# Patient Record
Sex: FEMALE | URBAN - METROPOLITAN AREA
[De-identification: names, ages, dates, MRNs, and addresses within clinical notes are randomized per-mention and may not be internally consistent; named-entity substitution may affect disease eponyms.]

---

## 2017-09-24 ENCOUNTER — EMERGENCY (EMERGENCY)
Facility: HOSPITAL | Age: 27
LOS: 1 days | Discharge: HOME | End: 2017-09-24
Attending: EMERGENCY MEDICINE | Admitting: EMERGENCY MEDICINE
Payer: COMMERCIAL

## 2017-09-24 VITALS
RESPIRATION RATE: 18 BRPM | SYSTOLIC BLOOD PRESSURE: 128 MMHG | HEART RATE: 68 BPM | DIASTOLIC BLOOD PRESSURE: 77 MMHG | TEMPERATURE: 98 F | OXYGEN SATURATION: 100 %

## 2017-09-24 PROCEDURE — 99285 EMERGENCY DEPT VISIT HI MDM: CPT | Mod: 25

## 2017-09-24 RX ORDER — KETOROLAC TROMETHAMINE 30 MG/ML
30 SYRINGE (ML) INJECTION ONCE
Refills: 0 | Status: DISCONTINUED | OUTPATIENT
Start: 2017-09-24 | End: 2017-09-24

## 2017-09-24 RX ORDER — SODIUM CHLORIDE 9 MG/ML
1000 INJECTION INTRAMUSCULAR; INTRAVENOUS; SUBCUTANEOUS ONCE
Refills: 0 | Status: COMPLETED | OUTPATIENT
Start: 2017-09-24 | End: 2017-09-24

## 2017-09-24 RX ORDER — SODIUM CHLORIDE 9 MG/ML
3 INJECTION INTRAMUSCULAR; INTRAVENOUS; SUBCUTANEOUS ONCE
Refills: 0 | Status: COMPLETED | OUTPATIENT
Start: 2017-09-24 | End: 2017-09-24

## 2017-09-24 RX ORDER — ONDANSETRON 8 MG/1
4 TABLET, FILM COATED ORAL ONCE
Refills: 0 | Status: COMPLETED | OUTPATIENT
Start: 2017-09-24 | End: 2017-09-24

## 2017-09-24 RX ADMIN — ONDANSETRON 4 MILLIGRAM(S): 8 TABLET, FILM COATED ORAL at 23:30

## 2017-09-24 RX ADMIN — SODIUM CHLORIDE 3 MILLILITER(S): 9 INJECTION INTRAMUSCULAR; INTRAVENOUS; SUBCUTANEOUS at 23:30

## 2017-09-24 RX ADMIN — SODIUM CHLORIDE 1000 MILLILITER(S): 9 INJECTION INTRAMUSCULAR; INTRAVENOUS; SUBCUTANEOUS at 23:30

## 2017-09-24 RX ADMIN — Medication 30 MILLIGRAM(S): at 23:45

## 2017-09-24 RX ADMIN — Medication 30 MILLIGRAM(S): at 23:30

## 2017-09-24 NOTE — ED PROVIDER NOTE - MEDICAL DECISION MAKING DETAILS
pt with history of stones presenting w L sided pain.  Concern for stone.  Poor response to toradol and UA showing infection.  Will require CT to ensure not an infected stone.  If negative will treat for pyelo if positive will give IV Abx and consult urology

## 2017-09-24 NOTE — ED PROVIDER NOTE - CARE PLAN
Principal Discharge DX:	Ureteral colic Principal Discharge DX:	Ureteral colic  Instructions for follow-up, activity and diet:	Take medication as prescribed. Rest, drink plenty of fluids.  Advance activity as tolerated.  Continue all previously prescribed medications as directed. You can use motrin 600mg every 6-8 hours for pain or fever, and/or Tylenol 650 mg every 4 hours for pain/fever. Follow up with your primary care physician in 48-72 hours- bring copies of your results.  Return to the emergency department for chest pain, shortness of breath, dizziness, or worsening, concerning, or persistent symptoms. Principal Discharge DX:	Pyelonephritis  Instructions for follow-up, activity and diet:	Take medication as prescribed. Rest, drink plenty of fluids.  Advance activity as tolerated.  Continue all previously prescribed medications as directed. You can use motrin 600mg every 6-8 hours for pain or fever, and/or Tylenol 650 mg every 4 hours for pain/fever. Follow up with your primary care physician in 48-72 hours- bring copies of your results.  Return to the emergency department for chest pain, shortness of breath, dizziness, or worsening, concerning, or persistent symptoms.

## 2017-09-24 NOTE — ED PROVIDER NOTE - OBJECTIVE STATEMENT
27 yo F with history of 6 R sided kidney stones never needing intervention presenting with L flank pain for the last day.  Similar to previous stones.  +Nausea and no vomiting no fevers and no dysuria.  Pain is severe sharp and radiates to the lower back and groin.

## 2017-09-24 NOTE — ED ADULT TRIAGE NOTE - CHIEF COMPLAINT QUOTE
c/o left back/flank radiating down to left leg x 1 day. pt endorses nausea. denies fever, chills, or urinary symptoms. hx of kidney stones.

## 2017-09-24 NOTE — ED PROVIDER NOTE - PLAN OF CARE
Take medication as prescribed. Rest, drink plenty of fluids.  Advance activity as tolerated.  Continue all previously prescribed medications as directed. You can use motrin 600mg every 6-8 hours for pain or fever, and/or Tylenol 650 mg every 4 hours for pain/fever. Follow up with your primary care physician in 48-72 hours- bring copies of your results.  Return to the emergency department for chest pain, shortness of breath, dizziness, or worsening, concerning, or persistent symptoms.

## 2017-09-25 VITALS
SYSTOLIC BLOOD PRESSURE: 97 MMHG | HEART RATE: 83 BPM | OXYGEN SATURATION: 100 % | RESPIRATION RATE: 18 BRPM | DIASTOLIC BLOOD PRESSURE: 56 MMHG

## 2017-09-25 LAB
APPEARANCE UR: SIGNIFICANT CHANGE UP
BACTERIA # UR AUTO: HIGH
BASOPHILS # BLD AUTO: 0.03 K/UL — SIGNIFICANT CHANGE UP (ref 0–0.2)
BASOPHILS NFR BLD AUTO: 0.4 % — SIGNIFICANT CHANGE UP (ref 0–2)
BILIRUB UR-MCNC: NEGATIVE — SIGNIFICANT CHANGE UP
BLOOD UR QL VISUAL: NEGATIVE — SIGNIFICANT CHANGE UP
BUN SERPL-MCNC: 13 MG/DL — SIGNIFICANT CHANGE UP (ref 7–23)
CALCIUM SERPL-MCNC: 9 MG/DL — SIGNIFICANT CHANGE UP (ref 8.4–10.5)
CHLORIDE SERPL-SCNC: 105 MMOL/L — SIGNIFICANT CHANGE UP (ref 98–107)
CO2 SERPL-SCNC: 24 MMOL/L — SIGNIFICANT CHANGE UP (ref 22–31)
COLOR SPEC: YELLOW — SIGNIFICANT CHANGE UP
CREAT SERPL-MCNC: 0.66 MG/DL — SIGNIFICANT CHANGE UP (ref 0.5–1.3)
EOSINOPHIL # BLD AUTO: 0.06 K/UL — SIGNIFICANT CHANGE UP (ref 0–0.5)
EOSINOPHIL NFR BLD AUTO: 0.8 % — SIGNIFICANT CHANGE UP (ref 0–6)
GLUCOSE SERPL-MCNC: 93 MG/DL — SIGNIFICANT CHANGE UP (ref 70–99)
GLUCOSE UR-MCNC: NEGATIVE — SIGNIFICANT CHANGE UP
HCT VFR BLD CALC: 36.1 % — SIGNIFICANT CHANGE UP (ref 34.5–45)
HGB BLD-MCNC: 12.9 G/DL — SIGNIFICANT CHANGE UP (ref 11.5–15.5)
HYALINE CASTS # UR AUTO: SIGNIFICANT CHANGE UP (ref 0–?)
IMM GRANULOCYTES # BLD AUTO: 0.02 # — SIGNIFICANT CHANGE UP
IMM GRANULOCYTES NFR BLD AUTO: 0.3 % — SIGNIFICANT CHANGE UP (ref 0–1.5)
KETONES UR-MCNC: NEGATIVE — SIGNIFICANT CHANGE UP
LEUKOCYTE ESTERASE UR-ACNC: HIGH
LYMPHOCYTES # BLD AUTO: 2.49 K/UL — SIGNIFICANT CHANGE UP (ref 1–3.3)
LYMPHOCYTES # BLD AUTO: 34.1 % — SIGNIFICANT CHANGE UP (ref 13–44)
MCHC RBC-ENTMCNC: 32.7 PG — SIGNIFICANT CHANGE UP (ref 27–34)
MCHC RBC-ENTMCNC: 35.7 % — SIGNIFICANT CHANGE UP (ref 32–36)
MCV RBC AUTO: 91.6 FL — SIGNIFICANT CHANGE UP (ref 80–100)
MONOCYTES # BLD AUTO: 0.76 K/UL — SIGNIFICANT CHANGE UP (ref 0–0.9)
MONOCYTES NFR BLD AUTO: 10.4 % — SIGNIFICANT CHANGE UP (ref 2–14)
MUCOUS THREADS # UR AUTO: SIGNIFICANT CHANGE UP
NEUTROPHILS # BLD AUTO: 3.95 K/UL — SIGNIFICANT CHANGE UP (ref 1.8–7.4)
NEUTROPHILS NFR BLD AUTO: 54 % — SIGNIFICANT CHANGE UP (ref 43–77)
NITRITE UR-MCNC: NEGATIVE — SIGNIFICANT CHANGE UP
NRBC # FLD: 0 — SIGNIFICANT CHANGE UP
PH UR: 7.5 — SIGNIFICANT CHANGE UP (ref 4.6–8)
PLATELET # BLD AUTO: 220 K/UL — SIGNIFICANT CHANGE UP (ref 150–400)
PMV BLD: 10.6 FL — SIGNIFICANT CHANGE UP (ref 7–13)
POTASSIUM SERPL-MCNC: 4 MMOL/L — SIGNIFICANT CHANGE UP (ref 3.5–5.3)
POTASSIUM SERPL-SCNC: 4 MMOL/L — SIGNIFICANT CHANGE UP (ref 3.5–5.3)
PROT UR-MCNC: 30 — HIGH
RBC # BLD: 3.94 M/UL — SIGNIFICANT CHANGE UP (ref 3.8–5.2)
RBC # FLD: 12.4 % — SIGNIFICANT CHANGE UP (ref 10.3–14.5)
SODIUM SERPL-SCNC: 141 MMOL/L — SIGNIFICANT CHANGE UP (ref 135–145)
SP GR SPEC: 1.02 — SIGNIFICANT CHANGE UP (ref 1–1.03)
SQUAMOUS # UR AUTO: SIGNIFICANT CHANGE UP
UROBILINOGEN FLD QL: NORMAL E.U. — SIGNIFICANT CHANGE UP (ref 0.1–0.2)
WBC # BLD: 7.31 K/UL — SIGNIFICANT CHANGE UP (ref 3.8–10.5)
WBC # FLD AUTO: 7.31 K/UL — SIGNIFICANT CHANGE UP (ref 3.8–10.5)
WBC CLUMPS #/AREA URNS HPF: PRESENT — HIGH (ref 0–?)
WBC UR QL: SIGNIFICANT CHANGE UP (ref 0–?)
YEAST BUDDING # UR COMP ASSIST: SIGNIFICANT CHANGE UP

## 2017-09-25 PROCEDURE — 74176 CT ABD & PELVIS W/O CONTRAST: CPT | Mod: 26,GC

## 2017-09-25 RX ORDER — CEFUROXIME AXETIL 250 MG
1 TABLET ORAL
Qty: 14 | Refills: 0
Start: 2017-09-25 | End: 2017-10-02

## 2017-09-25 RX ORDER — CEFTRIAXONE 500 MG/1
1 INJECTION, POWDER, FOR SOLUTION INTRAMUSCULAR; INTRAVENOUS ONCE
Refills: 0 | Status: COMPLETED | OUTPATIENT
Start: 2017-09-25 | End: 2017-09-25

## 2017-09-25 RX ORDER — MORPHINE SULFATE 50 MG/1
4 CAPSULE, EXTENDED RELEASE ORAL ONCE
Refills: 0 | Status: DISCONTINUED | OUTPATIENT
Start: 2017-09-25 | End: 2017-09-25

## 2017-09-25 RX ADMIN — MORPHINE SULFATE 4 MILLIGRAM(S): 50 CAPSULE, EXTENDED RELEASE ORAL at 00:30

## 2017-09-25 RX ADMIN — MORPHINE SULFATE 4 MILLIGRAM(S): 50 CAPSULE, EXTENDED RELEASE ORAL at 00:15

## 2017-09-25 RX ADMIN — CEFTRIAXONE 100 GRAM(S): 500 INJECTION, POWDER, FOR SOLUTION INTRAMUSCULAR; INTRAVENOUS at 03:28

## 2019-06-20 PROBLEM — Z00.00 ENCOUNTER FOR PREVENTIVE HEALTH EXAMINATION: Noted: 2019-06-20

## 2019-06-25 ENCOUNTER — OUTPATIENT (OUTPATIENT)
Dept: OUTPATIENT SERVICES | Facility: HOSPITAL | Age: 29
LOS: 1 days | End: 2019-06-25
Payer: COMMERCIAL

## 2019-06-25 ENCOUNTER — APPOINTMENT (OUTPATIENT)
Dept: ULTRASOUND IMAGING | Facility: CLINIC | Age: 29
End: 2019-06-25
Payer: COMMERCIAL

## 2019-06-25 DIAGNOSIS — Z00.8 ENCOUNTER FOR OTHER GENERAL EXAMINATION: ICD-10-CM

## 2019-06-25 PROCEDURE — 76775 US EXAM ABDO BACK WALL LIM: CPT

## 2019-06-25 PROCEDURE — 76775 US EXAM ABDO BACK WALL LIM: CPT | Mod: 26

## 2019-08-23 ENCOUNTER — ASOB RESULT (OUTPATIENT)
Age: 29
End: 2019-08-23

## 2019-08-23 ENCOUNTER — APPOINTMENT (OUTPATIENT)
Dept: OBGYN | Facility: CLINIC | Age: 29
End: 2019-08-23
Payer: COMMERCIAL

## 2019-08-23 VITALS
HEART RATE: 91 BPM | BODY MASS INDEX: 24.86 KG/M2 | DIASTOLIC BLOOD PRESSURE: 79 MMHG | SYSTOLIC BLOOD PRESSURE: 127 MMHG | HEIGHT: 65 IN | WEIGHT: 149.19 LBS

## 2019-08-23 DIAGNOSIS — Z78.9 OTHER SPECIFIED HEALTH STATUS: ICD-10-CM

## 2019-08-23 DIAGNOSIS — Z97.5 PRESENCE OF (INTRAUTERINE) CONTRACEPTIVE DEVICE: ICD-10-CM

## 2019-08-23 PROCEDURE — 99385 PREV VISIT NEW AGE 18-39: CPT

## 2019-08-23 PROCEDURE — 76830 TRANSVAGINAL US NON-OB: CPT

## 2019-08-23 NOTE — PHYSICAL EXAM
[Awake] : awake [Acute Distress] : no acute distress [Mass] : no breast mass [Alert] : alert [Nipple Discharge] : no nipple discharge [Soft] : soft [Axillary LAD] : no axillary lymphadenopathy [Tender] : non tender [Oriented x3] : oriented to person, place, and time [No Bleeding] : there was no active vaginal bleeding [Normal] : cervix [Pap Obtained] : a Pap smear was performed [IUD String] : did not have an IUD string protruding out [Uterine Adnexae] : were not tender and not enlarged

## 2019-08-23 NOTE — HISTORY OF PRESENT ILLNESS
[___ Year(s) Ago] : [unfilled] year(s) ago [Good] : being in good health [Last Pap Smear ___] : last Papanicolaou cytology done [unfilled] [No Previous Mammogram] : no previous mammogram [Last Colonoscopy ___] : last colonoscopy done [unfilled] [Reproductive Age] : is of reproductive age [unknown] : the patient is unsure of the date of her LMP

## 2019-08-27 ENCOUNTER — OUTPATIENT (OUTPATIENT)
Dept: OUTPATIENT SERVICES | Facility: HOSPITAL | Age: 29
LOS: 1 days | End: 2019-08-27

## 2019-08-27 ENCOUNTER — TRANSCRIPTION ENCOUNTER (OUTPATIENT)
Age: 29
End: 2019-08-27

## 2019-08-27 VITALS
SYSTOLIC BLOOD PRESSURE: 108 MMHG | DIASTOLIC BLOOD PRESSURE: 64 MMHG | OXYGEN SATURATION: 98 % | HEIGHT: 65 IN | RESPIRATION RATE: 16 BRPM | HEART RATE: 68 BPM | WEIGHT: 149.91 LBS | TEMPERATURE: 98 F

## 2019-08-27 DIAGNOSIS — T83.39XA OTHER MECHANICAL COMPLICATION OF INTRAUTERINE CONTRACEPTIVE DEVICE, INITIAL ENCOUNTER: ICD-10-CM

## 2019-08-27 DIAGNOSIS — K08.409 PARTIAL LOSS OF TEETH, UNSPECIFIED CAUSE, UNSPECIFIED CLASS: Chronic | ICD-10-CM

## 2019-08-27 DIAGNOSIS — Z98.890 OTHER SPECIFIED POSTPROCEDURAL STATES: Chronic | ICD-10-CM

## 2019-08-27 LAB
HCT VFR BLD CALC: 40.7 % — SIGNIFICANT CHANGE UP (ref 34.5–45)
HGB BLD-MCNC: 14 G/DL — SIGNIFICANT CHANGE UP (ref 11.5–15.5)
HPV HIGH+LOW RISK DNA PNL CVX: NOT DETECTED
MCHC RBC-ENTMCNC: 32.4 PG — SIGNIFICANT CHANGE UP (ref 27–34)
MCHC RBC-ENTMCNC: 34.4 % — SIGNIFICANT CHANGE UP (ref 32–36)
MCV RBC AUTO: 94.2 FL — SIGNIFICANT CHANGE UP (ref 80–100)
NRBC # FLD: 0 K/UL — SIGNIFICANT CHANGE UP (ref 0–0)
PLATELET # BLD AUTO: 242 K/UL — SIGNIFICANT CHANGE UP (ref 150–400)
PMV BLD: 10.3 FL — SIGNIFICANT CHANGE UP (ref 7–13)
RBC # BLD: 4.32 M/UL — SIGNIFICANT CHANGE UP (ref 3.8–5.2)
RBC # FLD: 12.7 % — SIGNIFICANT CHANGE UP (ref 10.3–14.5)
WBC # BLD: 5.53 K/UL — SIGNIFICANT CHANGE UP (ref 3.8–10.5)
WBC # FLD AUTO: 5.53 K/UL — SIGNIFICANT CHANGE UP (ref 3.8–10.5)

## 2019-08-27 RX ORDER — SODIUM CHLORIDE 9 MG/ML
1000 INJECTION, SOLUTION INTRAVENOUS
Refills: 0 | Status: DISCONTINUED | OUTPATIENT
Start: 2019-08-28 | End: 2019-09-14

## 2019-08-27 NOTE — H&P PST ADULT - HISTORY OF PRESENT ILLNESS
29y/o female presents for preop eval for scheduled d&c hysteroscopy, IUD removal on 8/28/19.  Pt states IUD shifted.

## 2019-08-27 NOTE — H&P PST ADULT - NSANTHOSAYNRD_GEN_A_CORE
No. ADI screening performed.  STOP BANG Legend: 0-2 = LOW Risk; 3-4 = INTERMEDIATE Risk; 5-8 = HIGH Risk

## 2019-08-27 NOTE — H&P PST ADULT - NSICDXPROBLEM_GEN_ALL_CORE_FT
other mechanical complication of IUD  scheduled d&c hysteroscopy, IUD removal on 8/28/19.  preop instructions, gi prophylaxis given   pt verbalized understanding  case discuss with Dr Peter giraldo on admit

## 2019-08-27 NOTE — H&P PST ADULT - NEGATIVE GENERAL GENITOURINARY SYMPTOMS
no urinary hesitancy/normal urinary frequency/no renal colic/no flank pain L/no flank pain R/no dysuria

## 2019-08-27 NOTE — H&P PST ADULT - NSICDXPASTSURGICALHX_GEN_ALL_CORE_FT
PAST SURGICAL HISTORY:  H/O arthroscopy right knee 2013, right shoulder 2007    [place] teeth extracted 2008

## 2019-08-28 ENCOUNTER — OUTPATIENT (OUTPATIENT)
Dept: OUTPATIENT SERVICES | Facility: HOSPITAL | Age: 29
LOS: 1 days | Discharge: HOME | End: 2019-08-28
Payer: COMMERCIAL

## 2019-08-28 ENCOUNTER — APPOINTMENT (OUTPATIENT)
Dept: OBGYN | Facility: HOSPITAL | Age: 29
End: 2019-08-28

## 2019-08-28 VITALS
RESPIRATION RATE: 17 BRPM | SYSTOLIC BLOOD PRESSURE: 123 MMHG | WEIGHT: 149.91 LBS | TEMPERATURE: 98 F | OXYGEN SATURATION: 100 % | HEART RATE: 78 BPM | HEIGHT: 65 IN | DIASTOLIC BLOOD PRESSURE: 63 MMHG

## 2019-08-28 VITALS
RESPIRATION RATE: 16 BRPM | HEART RATE: 67 BPM | SYSTOLIC BLOOD PRESSURE: 108 MMHG | DIASTOLIC BLOOD PRESSURE: 69 MMHG | OXYGEN SATURATION: 100 %

## 2019-08-28 DIAGNOSIS — Z98.890 OTHER SPECIFIED POSTPROCEDURAL STATES: Chronic | ICD-10-CM

## 2019-08-28 DIAGNOSIS — K08.409 PARTIAL LOSS OF TEETH, UNSPECIFIED CAUSE, UNSPECIFIED CLASS: Chronic | ICD-10-CM

## 2019-08-28 DIAGNOSIS — T83.39XA OTHER MECHANICAL COMPLICATION OF INTRAUTERINE CONTRACEPTIVE DEVICE, INITIAL ENCOUNTER: ICD-10-CM

## 2019-08-28 DIAGNOSIS — Z30.432 ENCOUNTER FOR REMOVAL OF INTRAUTERINE CONTRACEPTIVE DEVICE: ICD-10-CM

## 2019-08-28 LAB — HCG UR QL: NEGATIVE — SIGNIFICANT CHANGE UP

## 2019-08-28 PROCEDURE — 58301 REMOVE INTRAUTERINE DEVICE: CPT | Mod: GC

## 2019-08-28 NOTE — ASU DISCHARGE PLAN (ADULT/PEDIATRIC) - CALL YOUR DOCTOR IF YOU HAVE ANY OF THE FOLLOWING:
Bleeding that does not stop Bleeding that does not stop/Fever greater than (need to indicate Fahrenheit or Celsius)

## 2019-08-28 NOTE — ASU DISCHARGE PLAN (ADULT/PEDIATRIC) - CARE PROVIDER_API CALL
Rosas Dyer)  OBSGYN  General  [facility], [address]th [place] [address]  Phone: [phone]  Fax: [phone]  Follow Up Time:

## 2019-08-28 NOTE — ASU DISCHARGE PLAN (ADULT/PEDIATRIC) - ASU DC SPECIAL INSTRUCTIONSFT
You were given 1000mg IV Tylenol for pain management.  Please DO NOT take any Tylenol containing products, such as  Vicodin, Percocet, Excedrin, many cold preparations for the next 6 hours (until 830p).  DO NOT EXCEED 3000MG OF TYLENOL OVER 24 HOURS.     You were given Toradol for pain management. Please DO Not take Motrin/Ibuprofen/Advil/Aleve (NSAIDS) for the next 6 hours (Until 815p)

## 2019-08-29 LAB — CYTOLOGY CVX/VAG DOC THIN PREP: NORMAL

## 2019-09-13 ENCOUNTER — APPOINTMENT (OUTPATIENT)
Dept: OBGYN | Facility: CLINIC | Age: 29
End: 2019-09-13
Payer: COMMERCIAL

## 2019-09-13 VITALS
DIASTOLIC BLOOD PRESSURE: 80 MMHG | HEART RATE: 86 BPM | HEIGHT: 65 IN | SYSTOLIC BLOOD PRESSURE: 119 MMHG | WEIGHT: 150 LBS | BODY MASS INDEX: 24.99 KG/M2

## 2019-09-13 PROCEDURE — 99024 POSTOP FOLLOW-UP VISIT: CPT

## 2019-09-13 NOTE — HISTORY OF PRESENT ILLNESS
[0/10] : no pain reported [Fever] : no fever [Vaginal Bleeding] : no vaginal bleeding [None] : no vaginal bleeding [Doing Well] : is doing well [Normal] : the vagina was normal [Excellent Pain Control] : has excellent pain control [de-identified] : rto in 1 year for annual or prn [No Sign of Infection] : is showing no signs of infection

## 2019-12-20 ENCOUNTER — APPOINTMENT (OUTPATIENT)
Dept: OBGYN | Facility: CLINIC | Age: 29
End: 2019-12-20
Payer: COMMERCIAL

## 2019-12-20 ENCOUNTER — ASOB RESULT (OUTPATIENT)
Age: 29
End: 2019-12-20

## 2019-12-20 VITALS
DIASTOLIC BLOOD PRESSURE: 78 MMHG | HEART RATE: 72 BPM | HEIGHT: 65 IN | SYSTOLIC BLOOD PRESSURE: 121 MMHG | WEIGHT: 150 LBS | BODY MASS INDEX: 24.99 KG/M2

## 2019-12-20 DIAGNOSIS — Z32.00 ENCOUNTER FOR PREGNANCY TEST, RESULT UNKNOWN: ICD-10-CM

## 2019-12-20 PROCEDURE — 76817 TRANSVAGINAL US OBSTETRIC: CPT

## 2019-12-20 PROCEDURE — 99213 OFFICE O/P EST LOW 20 MIN: CPT

## 2019-12-23 LAB
BACTERIA UR CULT: NORMAL
C TRACH RRNA SPEC QL NAA+PROBE: NOT DETECTED
N GONORRHOEA RRNA SPEC QL NAA+PROBE: NOT DETECTED
SOURCE AMPLIFICATION: NORMAL

## 2019-12-27 NOTE — COUNSELING
[Breast Self Exam] : breast self exam [Nutrition] : nutrition [Exercise] : exercise [Vitamins/Supplements] : vitamins/supplements [STD (testing, results, tx)] : STD (testing, results, tx) [Pregnancy Options] : pregnancy options

## 2019-12-27 NOTE — HISTORY OF PRESENT ILLNESS
[Definite:  ___ (Date)] : the last menstrual period was [unfilled] [Normal Amount/Duration] : was of a normal amount and duration [Spotting Between  Menses] : no spotting between menses [Regular Cycle Intervals] : periods have been regular [Frequency: Q ___ days] : menstrual periods occur approximately every [unfilled] days [Menstrual Cramps] : menstrual cramps

## 2020-01-17 ENCOUNTER — APPOINTMENT (OUTPATIENT)
Dept: OBGYN | Facility: CLINIC | Age: 30
End: 2020-01-17
Payer: COMMERCIAL

## 2020-01-17 ENCOUNTER — NON-APPOINTMENT (OUTPATIENT)
Age: 30
End: 2020-01-17

## 2020-01-17 VITALS
HEIGHT: 65 IN | BODY MASS INDEX: 24.66 KG/M2 | DIASTOLIC BLOOD PRESSURE: 78 MMHG | WEIGHT: 148 LBS | SYSTOLIC BLOOD PRESSURE: 139 MMHG

## 2020-01-17 VITALS — SYSTOLIC BLOOD PRESSURE: 133 MMHG | DIASTOLIC BLOOD PRESSURE: 76 MMHG

## 2020-01-17 DIAGNOSIS — Z34.01 ENCOUNTER FOR SUPERVISION OF NORMAL FIRST PREGNANCY, FIRST TRIMESTER: ICD-10-CM

## 2020-01-17 PROCEDURE — 0502F SUBSEQUENT PRENATAL CARE: CPT

## 2020-01-19 LAB
ABO + RH PNL BLD: NORMAL
BASOPHILS # BLD AUTO: 0.01 K/UL
BASOPHILS NFR BLD AUTO: 0.1 %
BLD GP AB SCN SERPL QL: NORMAL
CMV IGG SERPL QL: <0.2 U/ML
CMV IGG SERPL-IMP: NEGATIVE
CMV IGM SERPL QL: <8 AU/ML
CMV IGM SERPL QL: NEGATIVE
EOSINOPHIL # BLD AUTO: 0.01 K/UL
EOSINOPHIL NFR BLD AUTO: 0.1 %
HBV SURFACE AG SER QL: NONREACTIVE
HCT VFR BLD CALC: 39.7 %
HCV AB SER QL: NONREACTIVE
HCV S/CO RATIO: 0.08 S/CO
HGB BLD-MCNC: 13.4 G/DL
HIV1+2 AB SPEC QL IA.RAPID: NONREACTIVE
IMM GRANULOCYTES NFR BLD AUTO: 0.3 %
LEAD BLD-MCNC: <1 UG/DL
LYMPHOCYTES # BLD AUTO: 1.31 K/UL
LYMPHOCYTES NFR BLD AUTO: 18.8 %
MAN DIFF?: NORMAL
MCHC RBC-ENTMCNC: 32 PG
MCHC RBC-ENTMCNC: 33.8 GM/DL
MCV RBC AUTO: 94.7 FL
MEV IGG FLD QL IA: 137 AU/ML
MEV IGG+IGM SER-IMP: POSITIVE
MEV IGM SER QL: NEGATIVE
MONOCYTES # BLD AUTO: 0.48 K/UL
MONOCYTES NFR BLD AUTO: 6.9 %
NEUTROPHILS # BLD AUTO: 5.15 K/UL
NEUTROPHILS NFR BLD AUTO: 73.8 %
PLATELET # BLD AUTO: 256 K/UL
RBC # BLD: 4.19 M/UL
RBC # FLD: 12.5 %
RUBV IGG FLD-ACNC: 5 INDEX
RUBV IGG SER-IMP: POSITIVE
T PALLIDUM AB SER QL IA: NEGATIVE
TSH SERPL-ACNC: 0.64 UIU/ML
VZV AB TITR SER: POSITIVE
VZV IGG SER IF-ACNC: 269.7 INDEX
WBC # FLD AUTO: 6.98 K/UL

## 2020-01-23 LAB
B19V IGG SER QL IA: 7.6 INDEX
B19V IGG+IGM SER-IMP: NORMAL
B19V IGG+IGM SER-IMP: POSITIVE
B19V IGM FLD-ACNC: 0.2 INDEX
B19V IGM SER-ACNC: NEGATIVE
HGB A MFR BLD: 97.8 %
HGB A2 MFR BLD: 2.2 %
HGB FRACT BLD-IMP: NORMAL

## 2020-01-24 ENCOUNTER — OTHER (OUTPATIENT)
Age: 30
End: 2020-01-24

## 2020-01-24 ENCOUNTER — MED ADMIN CHARGE (OUTPATIENT)
Age: 30
End: 2020-01-24

## 2020-01-27 ENCOUNTER — OTHER (OUTPATIENT)
Age: 30
End: 2020-01-27

## 2020-01-28 ENCOUNTER — ASOB RESULT (OUTPATIENT)
Age: 30
End: 2020-01-28

## 2020-01-28 ENCOUNTER — APPOINTMENT (OUTPATIENT)
Dept: ANTEPARTUM | Facility: CLINIC | Age: 30
End: 2020-01-28
Payer: COMMERCIAL

## 2020-01-28 LAB
AR GENE MUT ANL BLD/T: NEGATIVE
CFTR MUT TESTED BLD/T: NEGATIVE
FMR1 GENE MUT ANL BLD/T: NORMAL

## 2020-01-28 PROCEDURE — 76813 OB US NUCHAL MEAS 1 GEST: CPT

## 2020-01-28 PROCEDURE — 36416 COLLJ CAPILLARY BLOOD SPEC: CPT

## 2020-02-03 LAB
1ST TRIMESTER DATA: NORMAL
ADDENDUM DOC: NORMAL
AFP PNL SERPL: NORMAL
AFP SERPL-ACNC: NORMAL
CLINICAL BIOCHEMIST REVIEW: NORMAL
FREE BETA HCG 1ST TRIMESTER: NORMAL
Lab: NORMAL
NOTES NTD: NORMAL
NT: NORMAL
PAPP-A SERPL-ACNC: NORMAL
TRISOMY 18/3: NORMAL

## 2020-02-10 ENCOUNTER — APPOINTMENT (OUTPATIENT)
Dept: OBGYN | Facility: CLINIC | Age: 30
End: 2020-02-10
Payer: COMMERCIAL

## 2020-02-10 VITALS
BODY MASS INDEX: 24.32 KG/M2 | HEIGHT: 65 IN | DIASTOLIC BLOOD PRESSURE: 82 MMHG | SYSTOLIC BLOOD PRESSURE: 135 MMHG | WEIGHT: 146 LBS

## 2020-02-10 PROCEDURE — 0502F SUBSEQUENT PRENATAL CARE: CPT

## 2020-02-11 ENCOUNTER — NON-APPOINTMENT (OUTPATIENT)
Age: 30
End: 2020-02-11

## 2020-03-02 ENCOUNTER — NON-APPOINTMENT (OUTPATIENT)
Age: 30
End: 2020-03-02

## 2020-03-02 ENCOUNTER — LABORATORY RESULT (OUTPATIENT)
Age: 30
End: 2020-03-02

## 2020-03-02 ENCOUNTER — APPOINTMENT (OUTPATIENT)
Dept: OBGYN | Facility: CLINIC | Age: 30
End: 2020-03-02
Payer: COMMERCIAL

## 2020-03-02 VITALS
SYSTOLIC BLOOD PRESSURE: 126 MMHG | BODY MASS INDEX: 25.08 KG/M2 | DIASTOLIC BLOOD PRESSURE: 74 MMHG | WEIGHT: 150.5 LBS | HEIGHT: 65 IN

## 2020-03-02 DIAGNOSIS — Z34.92 ENCOUNTER FOR SUPERVISION OF NORMAL PREGNANCY, UNSPECIFIED, SECOND TRIMESTER: ICD-10-CM

## 2020-03-02 PROCEDURE — 0502F SUBSEQUENT PRENATAL CARE: CPT

## 2020-03-13 ENCOUNTER — APPOINTMENT (OUTPATIENT)
Dept: OBGYN | Facility: CLINIC | Age: 30
End: 2020-03-13

## 2020-03-23 ENCOUNTER — APPOINTMENT (OUTPATIENT)
Dept: ANTEPARTUM | Facility: CLINIC | Age: 30
End: 2020-03-23
Payer: COMMERCIAL

## 2020-03-23 ENCOUNTER — ASOB RESULT (OUTPATIENT)
Age: 30
End: 2020-03-23

## 2020-03-23 PROCEDURE — 76805 OB US >/= 14 WKS SNGL FETUS: CPT

## 2020-04-06 ENCOUNTER — APPOINTMENT (OUTPATIENT)
Dept: OBGYN | Facility: CLINIC | Age: 30
End: 2020-04-06

## 2020-04-20 ENCOUNTER — NON-APPOINTMENT (OUTPATIENT)
Age: 30
End: 2020-04-20

## 2020-04-20 ENCOUNTER — APPOINTMENT (OUTPATIENT)
Dept: OBGYN | Facility: CLINIC | Age: 30
End: 2020-04-20
Payer: COMMERCIAL

## 2020-04-20 VITALS
BODY MASS INDEX: 26.82 KG/M2 | WEIGHT: 161 LBS | SYSTOLIC BLOOD PRESSURE: 110 MMHG | DIASTOLIC BLOOD PRESSURE: 79 MMHG | HEIGHT: 65 IN

## 2020-04-20 PROCEDURE — 0502F SUBSEQUENT PRENATAL CARE: CPT

## 2020-04-21 LAB
BASOPHILS # BLD AUTO: 0.03 K/UL
BASOPHILS NFR BLD AUTO: 0.3 %
EOSINOPHIL # BLD AUTO: 0.03 K/UL
EOSINOPHIL NFR BLD AUTO: 0.3 %
GLUCOSE 1H P 50 G GLC PO SERPL-MCNC: 113 MG/DL
HCT VFR BLD CALC: 34.8 %
HGB BLD-MCNC: 11.9 G/DL
IMM GRANULOCYTES NFR BLD AUTO: 0.8 %
LYMPHOCYTES # BLD AUTO: 1.49 K/UL
LYMPHOCYTES NFR BLD AUTO: 15.5 %
MAN DIFF?: NORMAL
MCHC RBC-ENTMCNC: 32.7 PG
MCHC RBC-ENTMCNC: 34.2 GM/DL
MCV RBC AUTO: 95.6 FL
MONOCYTES # BLD AUTO: 0.7 K/UL
MONOCYTES NFR BLD AUTO: 7.3 %
NEUTROPHILS # BLD AUTO: 7.31 K/UL
NEUTROPHILS NFR BLD AUTO: 75.8 %
PLATELET # BLD AUTO: 265 K/UL
RBC # BLD: 3.64 M/UL
RBC # FLD: 13.6 %
WBC # FLD AUTO: 9.64 K/UL

## 2020-05-26 ENCOUNTER — APPOINTMENT (OUTPATIENT)
Dept: OBGYN | Facility: CLINIC | Age: 30
End: 2020-05-26
Payer: COMMERCIAL

## 2020-05-26 VITALS
HEIGHT: 65 IN | DIASTOLIC BLOOD PRESSURE: 75 MMHG | BODY MASS INDEX: 27.89 KG/M2 | SYSTOLIC BLOOD PRESSURE: 122 MMHG | WEIGHT: 167.4 LBS

## 2020-05-26 PROCEDURE — 0502F SUBSEQUENT PRENATAL CARE: CPT

## 2020-05-26 RX ORDER — DOXYLAMINE SUCCINATE AND PYRIDOXINE HYDROCHLORIDE 20; 20 MG/1; MG/1
20-20 TABLET, EXTENDED RELEASE ORAL
Qty: 60 | Refills: 2 | Status: COMPLETED | COMMUNITY
Start: 2020-02-10 | End: 2020-05-26

## 2020-06-09 ENCOUNTER — APPOINTMENT (OUTPATIENT)
Dept: OBGYN | Facility: CLINIC | Age: 30
End: 2020-06-09
Payer: COMMERCIAL

## 2020-06-09 ENCOUNTER — NON-APPOINTMENT (OUTPATIENT)
Age: 30
End: 2020-06-09

## 2020-06-09 VITALS
SYSTOLIC BLOOD PRESSURE: 125 MMHG | BODY MASS INDEX: 28.32 KG/M2 | DIASTOLIC BLOOD PRESSURE: 81 MMHG | HEIGHT: 65 IN | WEIGHT: 170 LBS

## 2020-06-09 DIAGNOSIS — Z23 ENCOUNTER FOR IMMUNIZATION: ICD-10-CM

## 2020-06-09 PROCEDURE — 90471 IMMUNIZATION ADMIN: CPT

## 2020-06-09 PROCEDURE — 0502F SUBSEQUENT PRENATAL CARE: CPT

## 2020-06-09 PROCEDURE — 90715 TDAP VACCINE 7 YRS/> IM: CPT

## 2020-06-15 ENCOUNTER — APPOINTMENT (OUTPATIENT)
Dept: ANTEPARTUM | Facility: CLINIC | Age: 30
End: 2020-06-15
Payer: COMMERCIAL

## 2020-06-15 ENCOUNTER — ASOB RESULT (OUTPATIENT)
Age: 30
End: 2020-06-15

## 2020-06-15 PROCEDURE — 76816 OB US FOLLOW-UP PER FETUS: CPT

## 2020-06-15 PROCEDURE — 76819 FETAL BIOPHYS PROFIL W/O NST: CPT

## 2020-06-23 ENCOUNTER — NON-APPOINTMENT (OUTPATIENT)
Age: 30
End: 2020-06-23

## 2020-06-23 ENCOUNTER — APPOINTMENT (OUTPATIENT)
Dept: OBGYN | Facility: CLINIC | Age: 30
End: 2020-06-23
Payer: COMMERCIAL

## 2020-06-23 VITALS
WEIGHT: 174 LBS | HEIGHT: 65 IN | BODY MASS INDEX: 28.99 KG/M2 | DIASTOLIC BLOOD PRESSURE: 74 MMHG | SYSTOLIC BLOOD PRESSURE: 121 MMHG

## 2020-06-23 DIAGNOSIS — O26.899 OTHER SPECIFIED PREGNANCY RELATED CONDITIONS, UNSPECIFIED TRIMESTER: ICD-10-CM

## 2020-06-23 DIAGNOSIS — G56.00 OTHER SPECIFIED PREGNANCY RELATED CONDITIONS, UNSPECIFIED TRIMESTER: ICD-10-CM

## 2020-06-23 DIAGNOSIS — Z34.93 ENCOUNTER FOR SUPERVISION OF NORMAL PREGNANCY, UNSPECIFIED, THIRD TRIMESTER: ICD-10-CM

## 2020-06-23 PROCEDURE — 0502F SUBSEQUENT PRENATAL CARE: CPT

## 2020-07-06 ENCOUNTER — APPOINTMENT (OUTPATIENT)
Dept: OBGYN | Facility: CLINIC | Age: 30
End: 2020-07-06
Payer: COMMERCIAL

## 2020-07-06 ENCOUNTER — NON-APPOINTMENT (OUTPATIENT)
Age: 30
End: 2020-07-06

## 2020-07-06 VITALS
HEIGHT: 65 IN | DIASTOLIC BLOOD PRESSURE: 82 MMHG | SYSTOLIC BLOOD PRESSURE: 130 MMHG | WEIGHT: 176 LBS | BODY MASS INDEX: 29.32 KG/M2

## 2020-07-06 DIAGNOSIS — O12.02 GESTATIONAL EDEMA, SECOND TRIMESTER: ICD-10-CM

## 2020-07-06 PROCEDURE — 0502F SUBSEQUENT PRENATAL CARE: CPT

## 2020-07-08 ENCOUNTER — OUTPATIENT (OUTPATIENT)
Dept: OUTPATIENT SERVICES | Facility: HOSPITAL | Age: 30
LOS: 1 days | End: 2020-07-08
Payer: COMMERCIAL

## 2020-07-08 ENCOUNTER — APPOINTMENT (OUTPATIENT)
Dept: ULTRASOUND IMAGING | Facility: CLINIC | Age: 30
End: 2020-07-08
Payer: COMMERCIAL

## 2020-07-08 ENCOUNTER — OUTPATIENT (OUTPATIENT)
Dept: OUTPATIENT SERVICES | Facility: HOSPITAL | Age: 30
LOS: 1 days | Discharge: HOME | End: 2020-07-08
Payer: COMMERCIAL

## 2020-07-08 VITALS — TEMPERATURE: 98 F

## 2020-07-08 VITALS — HEART RATE: 88 BPM | DIASTOLIC BLOOD PRESSURE: 77 MMHG | SYSTOLIC BLOOD PRESSURE: 126 MMHG

## 2020-07-08 DIAGNOSIS — Z3A.00 WEEKS OF GESTATION OF PREGNANCY NOT SPECIFIED: ICD-10-CM

## 2020-07-08 DIAGNOSIS — Z98.890 OTHER SPECIFIED POSTPROCEDURAL STATES: Chronic | ICD-10-CM

## 2020-07-08 DIAGNOSIS — O12.02 GESTATIONAL EDEMA, SECOND TRIMESTER: ICD-10-CM

## 2020-07-08 DIAGNOSIS — K08.409 PARTIAL LOSS OF TEETH, UNSPECIFIED CAUSE, UNSPECIFIED CLASS: Chronic | ICD-10-CM

## 2020-07-08 DIAGNOSIS — Z30.432 ENCOUNTER FOR REMOVAL OF INTRAUTERINE CONTRACEPTIVE DEVICE: Chronic | ICD-10-CM

## 2020-07-08 DIAGNOSIS — O26.899 OTHER SPECIFIED PREGNANCY RELATED CONDITIONS, UNSPECIFIED TRIMESTER: ICD-10-CM

## 2020-07-08 LAB
APPEARANCE UR: SIGNIFICANT CHANGE UP
BACTERIA # UR AUTO: SIGNIFICANT CHANGE UP
BILIRUB UR-MCNC: NEGATIVE — SIGNIFICANT CHANGE UP
BLOOD UR QL VISUAL: NEGATIVE — SIGNIFICANT CHANGE UP
COLOR SPEC: SIGNIFICANT CHANGE UP
GLUCOSE UR-MCNC: NEGATIVE — SIGNIFICANT CHANGE UP
HYALINE CASTS # UR AUTO: SIGNIFICANT CHANGE UP
KETONES UR-MCNC: NEGATIVE — SIGNIFICANT CHANGE UP
LEUKOCYTE ESTERASE UR-ACNC: NEGATIVE — SIGNIFICANT CHANGE UP
NITRITE UR-MCNC: NEGATIVE — SIGNIFICANT CHANGE UP
PH UR: 6.5 — SIGNIFICANT CHANGE UP (ref 5–8)
PROT UR-MCNC: NEGATIVE — SIGNIFICANT CHANGE UP
RBC CASTS # UR COMP ASSIST: SIGNIFICANT CHANGE UP (ref 0–?)
SP GR SPEC: 1.01 — SIGNIFICANT CHANGE UP (ref 1–1.04)
SQUAMOUS # UR AUTO: SIGNIFICANT CHANGE UP
UROBILINOGEN FLD QL: NORMAL — SIGNIFICANT CHANGE UP
WBC UR QL: SIGNIFICANT CHANGE UP (ref 0–?)

## 2020-07-08 PROCEDURE — 76815 OB US LIMITED FETUS(S): CPT | Mod: 26

## 2020-07-08 PROCEDURE — 93970 EXTREMITY STUDY: CPT

## 2020-07-08 PROCEDURE — 76818 FETAL BIOPHYS PROFILE W/NST: CPT | Mod: 26

## 2020-07-08 PROCEDURE — 93970 EXTREMITY STUDY: CPT | Mod: 26

## 2020-07-08 PROCEDURE — 59025 FETAL NON-STRESS TEST: CPT | Mod: 26

## 2020-07-08 NOTE — OB RN TRIAGE NOTE - PSH
Encounter for IUD removal  2019  H/O arthroscopy  right knee 2013, right shoulder 2007  [place] teeth extracted  2008

## 2020-07-08 NOTE — OB PROVIDER TRIAGE NOTE - NSHPPHYSICALEXAM_GEN_ALL_CORE
Vital Signs Last 24 Hrs  T(C): 36.8 (08 Jul 2020 17:39), Max: 36.8 (08 Jul 2020 17:39)  T(F): 98.24 (08 Jul 2020 17:39), Max: 98.24 (08 Jul 2020 17:39)  HR: 86 (08 Jul 2020 17:40) (86 - 86)  BP: 127/77 (08 Jul 2020 17:40) (127/77 - 127/77)  BP(mean): --  RR: --  SpO2: --    sve 0/0/-3

## 2020-07-08 NOTE — OB PROVIDER TRIAGE NOTE - NSHPLABSRESULTS_GEN_ALL_CORE
Urinalysis Basic - ( 2020 17:40 )    Color: LIGHT YELLOW / Appearance: Lt TURBID / S.010 / pH: 6.5  Gluc: NEGATIVE / Ketone: NEGATIVE  / Bili: NEGATIVE / Urobili: NORMAL   Blood: NEGATIVE / Protein: NEGATIVE / Nitrite: NEGATIVE   Leuk Esterase: NEGATIVE / RBC: 0-2 / WBC 3-5   Sq Epi: FEW / Non Sq Epi: x / Bacteria: FEW

## 2020-07-08 NOTE — OB PROVIDER TRIAGE NOTE - HISTORY OF PRESENT ILLNESS
This is a 29 year old patient of Dr Dyer  at 35.4 weeks gestational age presents with complaints of contraction 6 in 1 hour for the past 3 hours. states pain scale 7/10, reports +GFM< denies LOF, VB. denies urinary symptoms, dysuria, hematuria, foul smell or frequency.  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 states pain scale 7/10, reports +GFM< denies LOF, VB. denies urinary symptoms, dysuria, hematuria, foul smell or frequency.  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

## 2020-07-08 NOTE — OB PROVIDER TRIAGE NOTE - NSOBPROVIDERNOTE_OBGYN_ALL_OB_FT
PLan discussed with dr reddy  no evidence of  labor  maternal/fetal surveillance reassuring   labor precuations reviewed with patient  continue fetal kick counts, rest and activity as tolerated, increase PO fluid  follow up with dr reddy as scheduled  opt verbalized understanding of instructions given  discharged home

## 2020-07-13 ENCOUNTER — APPOINTMENT (OUTPATIENT)
Dept: OBGYN | Facility: CLINIC | Age: 30
End: 2020-07-13
Payer: COMMERCIAL

## 2020-07-13 ENCOUNTER — NON-APPOINTMENT (OUTPATIENT)
Age: 30
End: 2020-07-13

## 2020-07-13 VITALS
HEIGHT: 65 IN | DIASTOLIC BLOOD PRESSURE: 79 MMHG | WEIGHT: 178.44 LBS | BODY MASS INDEX: 29.73 KG/M2 | SYSTOLIC BLOOD PRESSURE: 127 MMHG

## 2020-07-13 PROCEDURE — 0502F SUBSEQUENT PRENATAL CARE: CPT

## 2020-07-14 ENCOUNTER — LABORATORY RESULT (OUTPATIENT)
Age: 30
End: 2020-07-14

## 2020-07-21 ENCOUNTER — NON-APPOINTMENT (OUTPATIENT)
Age: 30
End: 2020-07-21

## 2020-07-21 ENCOUNTER — APPOINTMENT (OUTPATIENT)
Dept: OBGYN | Facility: CLINIC | Age: 30
End: 2020-07-21
Payer: COMMERCIAL

## 2020-07-21 VITALS
DIASTOLIC BLOOD PRESSURE: 82 MMHG | HEIGHT: 65 IN | WEIGHT: 181 LBS | SYSTOLIC BLOOD PRESSURE: 125 MMHG | BODY MASS INDEX: 30.16 KG/M2

## 2020-07-21 VITALS — TEMPERATURE: 98.2 F

## 2020-07-21 PROCEDURE — 0502F SUBSEQUENT PRENATAL CARE: CPT

## 2020-07-21 RX ORDER — DOXYLAMINE SUCCINATE 25 MG
50 TABLET ORAL
Qty: 30 | Refills: 0 | Status: DISCONTINUED | COMMUNITY
Start: 2020-01-17 | End: 2020-07-21

## 2020-07-21 RX ORDER — PYRIDOXINE HCL (VITAMIN B6) 25 MG
25 TABLET ORAL
Qty: 90 | Refills: 0 | Status: DISCONTINUED | COMMUNITY
Start: 2020-01-17 | End: 2020-07-21

## 2020-07-28 ENCOUNTER — NON-APPOINTMENT (OUTPATIENT)
Age: 30
End: 2020-07-28

## 2020-07-28 ENCOUNTER — APPOINTMENT (OUTPATIENT)
Dept: OBGYN | Facility: CLINIC | Age: 30
End: 2020-07-28
Payer: COMMERCIAL

## 2020-07-28 VITALS
BODY MASS INDEX: 29.87 KG/M2 | DIASTOLIC BLOOD PRESSURE: 83 MMHG | WEIGHT: 179.25 LBS | HEIGHT: 65 IN | SYSTOLIC BLOOD PRESSURE: 122 MMHG

## 2020-07-28 PROBLEM — N20.0 CALCULUS OF KIDNEY: Chronic | Status: ACTIVE | Noted: 2020-07-08

## 2020-07-28 PROCEDURE — 0502F SUBSEQUENT PRENATAL CARE: CPT

## 2020-08-01 ENCOUNTER — APPOINTMENT (OUTPATIENT)
Dept: OBGYN | Facility: HOSPITAL | Age: 30
End: 2020-08-01

## 2020-08-04 ENCOUNTER — APPOINTMENT (OUTPATIENT)
Dept: OBGYN | Facility: CLINIC | Age: 30
End: 2020-08-04
Payer: COMMERCIAL

## 2020-08-04 VITALS
DIASTOLIC BLOOD PRESSURE: 95 MMHG | BODY MASS INDEX: 29.49 KG/M2 | SYSTOLIC BLOOD PRESSURE: 134 MMHG | HEIGHT: 65 IN | WEIGHT: 177 LBS

## 2020-08-04 VITALS — DIASTOLIC BLOOD PRESSURE: 86 MMHG | SYSTOLIC BLOOD PRESSURE: 136 MMHG

## 2020-08-04 PROCEDURE — 0502F SUBSEQUENT PRENATAL CARE: CPT

## 2020-08-05 ENCOUNTER — NON-APPOINTMENT (OUTPATIENT)
Age: 30
End: 2020-08-05

## 2020-08-06 ENCOUNTER — OUTPATIENT (OUTPATIENT)
Dept: INPATIENT UNIT | Facility: HOSPITAL | Age: 30
LOS: 1 days | Discharge: HOME | End: 2020-08-06
Payer: COMMERCIAL

## 2020-08-06 VITALS
RESPIRATION RATE: 18 BRPM | DIASTOLIC BLOOD PRESSURE: 86 MMHG | SYSTOLIC BLOOD PRESSURE: 134 MMHG | TEMPERATURE: 99 F | HEART RATE: 83 BPM

## 2020-08-06 VITALS — DIASTOLIC BLOOD PRESSURE: 84 MMHG | SYSTOLIC BLOOD PRESSURE: 124 MMHG | HEART RATE: 86 BPM

## 2020-08-06 DIAGNOSIS — K08.409 PARTIAL LOSS OF TEETH, UNSPECIFIED CAUSE, UNSPECIFIED CLASS: Chronic | ICD-10-CM

## 2020-08-06 DIAGNOSIS — Z98.890 OTHER SPECIFIED POSTPROCEDURAL STATES: Chronic | ICD-10-CM

## 2020-08-06 DIAGNOSIS — Z30.432 ENCOUNTER FOR REMOVAL OF INTRAUTERINE CONTRACEPTIVE DEVICE: Chronic | ICD-10-CM

## 2020-08-06 DIAGNOSIS — Z3A.00 WEEKS OF GESTATION OF PREGNANCY NOT SPECIFIED: ICD-10-CM

## 2020-08-06 DIAGNOSIS — O26.899 OTHER SPECIFIED PREGNANCY RELATED CONDITIONS, UNSPECIFIED TRIMESTER: ICD-10-CM

## 2020-08-06 PROCEDURE — 99214 OFFICE O/P EST MOD 30 MIN: CPT

## 2020-08-06 NOTE — OB PROVIDER TRIAGE NOTE - NSHPPHYSICALEXAM_GEN_ALL_CORE
VS: 124/84  TOCO: ctx irregular  NST:  +accesl, -decels moderate variability  SSE: no pooling, Nitrazine positive, ferning negative  VE: 1/L/H VS: 124/84  TOCO: ctx irregular  NST:  +accesl, -decels moderate variability  SSE: no pooling, Nitrazine positive, ferning negative  VE: 1/L/H  Sono: vertex, anterior placenta, BPP 8/8, LUCINDA 14 cm

## 2020-08-06 NOTE — OB RN TRIAGE NOTE - PSH
Encounter for IUD removal  2019  H/O arthroscopy  right knee 2013, right shoulder 2007  [place] teeth extracted  2008

## 2020-08-06 NOTE — OB PROVIDER TRIAGE NOTE - NSOBPROVIDERNOTE_OBGYN_ALL_OB_FT
29 y sage d @ 39.5 wks. 29 y rol d @ 39.5 wks.  No evidence of ROM  CAt 1 tracing   TOCOL: ctx irregular

## 2020-08-06 NOTE — OB PROVIDER TRIAGE NOTE - HISTORY OF PRESENT ILLNESS
29 yr old  @ 39.5 wks. presented with LOF @ 8 pm. denies VBV/LOF. Reports positive fetal movement. GBS negative. EFW 7 lbs  PNI: denies  Gynhx: denies  Surghx: denies  Medhx: denies

## 2020-08-06 NOTE — OB PROVIDER TRIAGE NOTE - PSH
Encounter for IUD removal  2019  H/O arthroscopy  right knee 2013, right shoulder 2007  [place] teeth extracted  2008

## 2020-08-11 ENCOUNTER — INPATIENT (INPATIENT)
Facility: HOSPITAL | Age: 30
LOS: 3 days | Discharge: HOME | End: 2020-08-15
Attending: STUDENT IN AN ORGANIZED HEALTH CARE EDUCATION/TRAINING PROGRAM | Admitting: STUDENT IN AN ORGANIZED HEALTH CARE EDUCATION/TRAINING PROGRAM
Payer: COMMERCIAL

## 2020-08-11 ENCOUNTER — APPOINTMENT (OUTPATIENT)
Dept: OBGYN | Facility: CLINIC | Age: 30
End: 2020-08-11
Payer: COMMERCIAL

## 2020-08-11 ENCOUNTER — NON-APPOINTMENT (OUTPATIENT)
Age: 30
End: 2020-08-11

## 2020-08-11 VITALS
DIASTOLIC BLOOD PRESSURE: 96 MMHG | TEMPERATURE: 99 F | HEART RATE: 108 BPM | RESPIRATION RATE: 16 BRPM | SYSTOLIC BLOOD PRESSURE: 132 MMHG

## 2020-08-11 VITALS
SYSTOLIC BLOOD PRESSURE: 137 MMHG | DIASTOLIC BLOOD PRESSURE: 90 MMHG | BODY MASS INDEX: 29.66 KG/M2 | WEIGHT: 178 LBS | HEIGHT: 65 IN

## 2020-08-11 VITALS — DIASTOLIC BLOOD PRESSURE: 96 MMHG | SYSTOLIC BLOOD PRESSURE: 138 MMHG

## 2020-08-11 DIAGNOSIS — Z3A.00 WEEKS OF GESTATION OF PREGNANCY NOT SPECIFIED: ICD-10-CM

## 2020-08-11 DIAGNOSIS — Z30.432 ENCOUNTER FOR REMOVAL OF INTRAUTERINE CONTRACEPTIVE DEVICE: Chronic | ICD-10-CM

## 2020-08-11 DIAGNOSIS — Z98.890 OTHER SPECIFIED POSTPROCEDURAL STATES: Chronic | ICD-10-CM

## 2020-08-11 DIAGNOSIS — K08.409 PARTIAL LOSS OF TEETH, UNSPECIFIED CAUSE, UNSPECIFIED CLASS: Chronic | ICD-10-CM

## 2020-08-11 DIAGNOSIS — O26.899 OTHER SPECIFIED PREGNANCY RELATED CONDITIONS, UNSPECIFIED TRIMESTER: ICD-10-CM

## 2020-08-11 LAB
ALBUMIN SERPL ELPH-MCNC: 3 G/DL — LOW (ref 3.3–5)
ALBUMIN SERPL ELPH-MCNC: 3.3 G/DL — SIGNIFICANT CHANGE UP (ref 3.3–5)
ALP SERPL-CCNC: 296 U/L — HIGH (ref 40–120)
ALP SERPL-CCNC: 326 U/L — HIGH (ref 40–120)
ALT FLD-CCNC: 12 U/L — SIGNIFICANT CHANGE UP (ref 4–33)
ALT FLD-CCNC: 12 U/L — SIGNIFICANT CHANGE UP (ref 4–33)
ANION GAP SERPL CALC-SCNC: 14 MMO/L — SIGNIFICANT CHANGE UP (ref 7–14)
ANION GAP SERPL CALC-SCNC: 14 MMO/L — SIGNIFICANT CHANGE UP (ref 7–14)
APPEARANCE UR: SIGNIFICANT CHANGE UP
APTT BLD: 24.7 SEC — LOW (ref 27–36.3)
APTT BLD: 26.7 SEC — LOW (ref 27–36.3)
AST SERPL-CCNC: 22 U/L — SIGNIFICANT CHANGE UP (ref 4–32)
AST SERPL-CCNC: 27 U/L — SIGNIFICANT CHANGE UP (ref 4–32)
BACTERIA # UR AUTO: HIGH
BASOPHILS # BLD AUTO: 0.01 K/UL — SIGNIFICANT CHANGE UP (ref 0–0.2)
BASOPHILS # BLD AUTO: 0.02 K/UL — SIGNIFICANT CHANGE UP (ref 0–0.2)
BASOPHILS NFR BLD AUTO: 0.1 % — SIGNIFICANT CHANGE UP (ref 0–2)
BASOPHILS NFR BLD AUTO: 0.2 % — SIGNIFICANT CHANGE UP (ref 0–2)
BILIRUB SERPL-MCNC: 0.2 MG/DL — SIGNIFICANT CHANGE UP (ref 0.2–1.2)
BILIRUB SERPL-MCNC: 0.2 MG/DL — SIGNIFICANT CHANGE UP (ref 0.2–1.2)
BILIRUB UR-MCNC: NEGATIVE — SIGNIFICANT CHANGE UP
BLD GP AB SCN SERPL QL: NEGATIVE — SIGNIFICANT CHANGE UP
BLOOD UR QL VISUAL: NEGATIVE — SIGNIFICANT CHANGE UP
BUN SERPL-MCNC: 10 MG/DL — SIGNIFICANT CHANGE UP (ref 7–23)
BUN SERPL-MCNC: 9 MG/DL — SIGNIFICANT CHANGE UP (ref 7–23)
CALCIUM SERPL-MCNC: 8.7 MG/DL — SIGNIFICANT CHANGE UP (ref 8.4–10.5)
CALCIUM SERPL-MCNC: 9.5 MG/DL — SIGNIFICANT CHANGE UP (ref 8.4–10.5)
CHLORIDE SERPL-SCNC: 103 MMOL/L — SIGNIFICANT CHANGE UP (ref 98–107)
CHLORIDE SERPL-SCNC: 105 MMOL/L — SIGNIFICANT CHANGE UP (ref 98–107)
CO2 SERPL-SCNC: 17 MMOL/L — LOW (ref 22–31)
CO2 SERPL-SCNC: 19 MMOL/L — LOW (ref 22–31)
COLOR SPEC: YELLOW — SIGNIFICANT CHANGE UP
CREAT ?TM UR-MCNC: 126 MG/DL — SIGNIFICANT CHANGE UP
CREAT SERPL-MCNC: 0.61 MG/DL — SIGNIFICANT CHANGE UP (ref 0.5–1.3)
CREAT SERPL-MCNC: 0.64 MG/DL — SIGNIFICANT CHANGE UP (ref 0.5–1.3)
EOSINOPHIL # BLD AUTO: 0.02 K/UL — SIGNIFICANT CHANGE UP (ref 0–0.5)
EOSINOPHIL # BLD AUTO: 0.03 K/UL — SIGNIFICANT CHANGE UP (ref 0–0.5)
EOSINOPHIL NFR BLD AUTO: 0.2 % — SIGNIFICANT CHANGE UP (ref 0–6)
EOSINOPHIL NFR BLD AUTO: 0.3 % — SIGNIFICANT CHANGE UP (ref 0–6)
FIBRINOGEN PPP-MCNC: 579 MG/DL — HIGH (ref 290–520)
FIBRINOGEN PPP-MCNC: 581 MG/DL — HIGH (ref 290–520)
GLUCOSE SERPL-MCNC: 84 MG/DL — SIGNIFICANT CHANGE UP (ref 70–99)
GLUCOSE SERPL-MCNC: 85 MG/DL — SIGNIFICANT CHANGE UP (ref 70–99)
GLUCOSE UR-MCNC: NEGATIVE — SIGNIFICANT CHANGE UP
HCT VFR BLD CALC: 35 % — SIGNIFICANT CHANGE UP (ref 34.5–45)
HCT VFR BLD CALC: 37.1 % — SIGNIFICANT CHANGE UP (ref 34.5–45)
HGB BLD-MCNC: 11.2 G/DL — LOW (ref 11.5–15.5)
HGB BLD-MCNC: 12.7 G/DL — SIGNIFICANT CHANGE UP (ref 11.5–15.5)
IMM GRANULOCYTES NFR BLD AUTO: 0.5 % — SIGNIFICANT CHANGE UP (ref 0–1.5)
IMM GRANULOCYTES NFR BLD AUTO: 0.6 % — SIGNIFICANT CHANGE UP (ref 0–1.5)
INR BLD: 1.01 — SIGNIFICANT CHANGE UP (ref 0.88–1.16)
INR BLD: 1.04 — SIGNIFICANT CHANGE UP (ref 0.88–1.16)
KETONES UR-MCNC: NEGATIVE — SIGNIFICANT CHANGE UP
LDH SERPL L TO P-CCNC: 215 U/L — SIGNIFICANT CHANGE UP (ref 135–225)
LDH SERPL L TO P-CCNC: 294 U/L — HIGH (ref 135–225)
LEUKOCYTE ESTERASE UR-ACNC: SIGNIFICANT CHANGE UP
LYMPHOCYTES # BLD AUTO: 17.8 % — SIGNIFICANT CHANGE UP (ref 13–44)
LYMPHOCYTES # BLD AUTO: 2.07 K/UL — SIGNIFICANT CHANGE UP (ref 1–3.3)
LYMPHOCYTES # BLD AUTO: 2.09 K/UL — SIGNIFICANT CHANGE UP (ref 1–3.3)
LYMPHOCYTES # BLD AUTO: 22.9 % — SIGNIFICANT CHANGE UP (ref 13–44)
MCHC RBC-ENTMCNC: 30.2 PG — SIGNIFICANT CHANGE UP (ref 27–34)
MCHC RBC-ENTMCNC: 31.7 PG — SIGNIFICANT CHANGE UP (ref 27–34)
MCHC RBC-ENTMCNC: 32 % — SIGNIFICANT CHANGE UP (ref 32–36)
MCHC RBC-ENTMCNC: 34.2 % — SIGNIFICANT CHANGE UP (ref 32–36)
MCV RBC AUTO: 92.5 FL — SIGNIFICANT CHANGE UP (ref 80–100)
MCV RBC AUTO: 94.3 FL — SIGNIFICANT CHANGE UP (ref 80–100)
MONOCYTES # BLD AUTO: 0.79 K/UL — SIGNIFICANT CHANGE UP (ref 0–0.9)
MONOCYTES # BLD AUTO: 0.83 K/UL — SIGNIFICANT CHANGE UP (ref 0–0.9)
MONOCYTES NFR BLD AUTO: 7.1 % — SIGNIFICANT CHANGE UP (ref 2–14)
MONOCYTES NFR BLD AUTO: 8.7 % — SIGNIFICANT CHANGE UP (ref 2–14)
NEUTROPHILS # BLD AUTO: 6.09 K/UL — SIGNIFICANT CHANGE UP (ref 1.8–7.4)
NEUTROPHILS # BLD AUTO: 8.71 K/UL — HIGH (ref 1.8–7.4)
NEUTROPHILS NFR BLD AUTO: 67.4 % — SIGNIFICANT CHANGE UP (ref 43–77)
NEUTROPHILS NFR BLD AUTO: 74.2 % — SIGNIFICANT CHANGE UP (ref 43–77)
NITRITE UR-MCNC: NEGATIVE — SIGNIFICANT CHANGE UP
NRBC # FLD: 0 K/UL — SIGNIFICANT CHANGE UP (ref 0–0)
NRBC # FLD: 0 K/UL — SIGNIFICANT CHANGE UP (ref 0–0)
PH UR: 7 — SIGNIFICANT CHANGE UP (ref 5–8)
PLATELET # BLD AUTO: 241 K/UL — SIGNIFICANT CHANGE UP (ref 150–400)
PLATELET # BLD AUTO: 287 K/UL — SIGNIFICANT CHANGE UP (ref 150–400)
PMV BLD: 11 FL — SIGNIFICANT CHANGE UP (ref 7–13)
PMV BLD: 11.1 FL — SIGNIFICANT CHANGE UP (ref 7–13)
POTASSIUM SERPL-MCNC: 4 MMOL/L — SIGNIFICANT CHANGE UP (ref 3.5–5.3)
POTASSIUM SERPL-MCNC: 4.7 MMOL/L — SIGNIFICANT CHANGE UP (ref 3.5–5.3)
POTASSIUM SERPL-SCNC: 4 MMOL/L — SIGNIFICANT CHANGE UP (ref 3.5–5.3)
POTASSIUM SERPL-SCNC: 4.7 MMOL/L — SIGNIFICANT CHANGE UP (ref 3.5–5.3)
PROT SERPL-MCNC: 5.6 G/DL — LOW (ref 6–8.3)
PROT SERPL-MCNC: 6.3 G/DL — SIGNIFICANT CHANGE UP (ref 6–8.3)
PROT UR-MCNC: 30 — SIGNIFICANT CHANGE UP
PROT UR-MCNC: 33.3 MG/DL — SIGNIFICANT CHANGE UP
PROTHROM AB SERPL-ACNC: 11.5 SEC — SIGNIFICANT CHANGE UP (ref 10.6–13.6)
PROTHROM AB SERPL-ACNC: 11.8 SEC — SIGNIFICANT CHANGE UP (ref 10.6–13.6)
RBC # BLD: 3.71 M/UL — LOW (ref 3.8–5.2)
RBC # BLD: 4.01 M/UL — SIGNIFICANT CHANGE UP (ref 3.8–5.2)
RBC # FLD: 14.3 % — SIGNIFICANT CHANGE UP (ref 10.3–14.5)
RBC # FLD: 14.5 % — SIGNIFICANT CHANGE UP (ref 10.3–14.5)
RBC CASTS # UR COMP ASSIST: SIGNIFICANT CHANGE UP (ref 0–?)
RH IG SCN BLD-IMP: POSITIVE — SIGNIFICANT CHANGE UP
RH IG SCN BLD-IMP: POSITIVE — SIGNIFICANT CHANGE UP
SARS-COV-2 RNA SPEC QL NAA+PROBE: SIGNIFICANT CHANGE UP
SODIUM SERPL-SCNC: 136 MMOL/L — SIGNIFICANT CHANGE UP (ref 135–145)
SODIUM SERPL-SCNC: 136 MMOL/L — SIGNIFICANT CHANGE UP (ref 135–145)
SP GR SPEC: 1.02 — SIGNIFICANT CHANGE UP (ref 1–1.04)
SQUAMOUS # UR AUTO: SIGNIFICANT CHANGE UP
T PALLIDUM AB TITR SER: NEGATIVE — SIGNIFICANT CHANGE UP
URATE SERPL-MCNC: 4.3 MG/DL — SIGNIFICANT CHANGE UP (ref 2.5–7)
URATE SERPL-MCNC: 4.5 MG/DL — SIGNIFICANT CHANGE UP (ref 2.5–7)
UROBILINOGEN FLD QL: NORMAL — SIGNIFICANT CHANGE UP
WBC # BLD: 11.73 K/UL — HIGH (ref 3.8–10.5)
WBC # BLD: 9.04 K/UL — SIGNIFICANT CHANGE UP (ref 3.8–10.5)
WBC # FLD AUTO: 11.73 K/UL — HIGH (ref 3.8–10.5)
WBC # FLD AUTO: 9.04 K/UL — SIGNIFICANT CHANGE UP (ref 3.8–10.5)
WBC UR QL: >50 — HIGH (ref 0–?)

## 2020-08-11 PROCEDURE — 0502F SUBSEQUENT PRENATAL CARE: CPT

## 2020-08-11 RX ORDER — SODIUM CHLORIDE 9 MG/ML
1000 INJECTION, SOLUTION INTRAVENOUS
Refills: 0 | Status: DISCONTINUED | OUTPATIENT
Start: 2020-08-11 | End: 2020-08-12

## 2020-08-11 RX ORDER — OXYTOCIN 10 UNIT/ML
333.33 VIAL (ML) INJECTION
Qty: 20 | Refills: 0 | Status: DISCONTINUED | OUTPATIENT
Start: 2020-08-11 | End: 2020-08-12

## 2020-08-11 RX ORDER — MAGNESIUM SULFATE 500 MG/ML
2 VIAL (ML) INJECTION
Qty: 40 | Refills: 0 | Status: DISCONTINUED | OUTPATIENT
Start: 2020-08-11 | End: 2020-08-12

## 2020-08-11 RX ORDER — SODIUM CHLORIDE 9 MG/ML
1000 INJECTION, SOLUTION INTRAVENOUS ONCE
Refills: 0 | Status: COMPLETED | OUTPATIENT
Start: 2020-08-11 | End: 2020-08-13

## 2020-08-11 RX ORDER — MAGNESIUM SULFATE 500 MG/ML
4 VIAL (ML) INJECTION ONCE
Refills: 0 | Status: DISCONTINUED | OUTPATIENT
Start: 2020-08-11 | End: 2020-08-12

## 2020-08-11 RX ORDER — SODIUM CHLORIDE 9 MG/ML
3 INJECTION INTRAMUSCULAR; INTRAVENOUS; SUBCUTANEOUS EVERY 8 HOURS
Refills: 0 | Status: DISCONTINUED | OUTPATIENT
Start: 2020-08-11 | End: 2020-08-15

## 2020-08-11 RX ADMIN — SODIUM CHLORIDE 125 MILLILITER(S): 9 INJECTION, SOLUTION INTRAVENOUS at 12:19

## 2020-08-11 NOTE — OB RN TRIAGE NOTE - PSH
Encounter for IUD removal  2019  H/O arthroscopy  right knee 2013, right shoulder 2007  [place] teeth extracted  2008

## 2020-08-11 NOTE — CHART NOTE - NSCHARTNOTEFT_GEN_A_CORE
Np note      Pt seen for placement of cervical balloon. S/P first dose of 20mg PO cytotec. Pt comfortable.     T(C): 36.6 (11 Aug 2020 12:06), Max: 37 (11 Aug 2020 10:02)  T(F): 97.9 (11 Aug 2020 12:06), Max: 98.6 (11 Aug 2020 10:02)  HR: 92 (11 Aug 2020 12:58) (72 - 120)  BP: 132/- (11 Aug 2020 12:59) (125/91 - 144/74)  RR: 15 (11 Aug 2020 12:06) (15 - 16)  SpO2: 98% (11 Aug 2020 12:53) (98% - 100%)  /moderate variability/+ accels/no decels  [place] q5-6min  SVE 1/40/-3 unchanged    Cervical balloon placed without incident. Instilled with 60ccs in uterine/vaginal balloons. Pt tolerated well.     28 y/o  @40+3w gHTN IOL on PO cytotec with Cat I tracing normal HELLP labs PCR 0.26    -Continue BP monitoring  -Maintain cervical balloon  -Continue PO cytotec    maxine, NP

## 2020-08-11 NOTE — OB PROVIDER TRIAGE NOTE - NSHPPHYSICALEXAM_GEN_ALL_CORE
Vital Signs Last 24 Hrs  T(C): 37.0 (11 Aug 2020 10:09), Max: 37 (11 Aug 2020 10:02)  T(F): 98.6 (11 Aug 2020 10:09), Max: 98.6 (11 Aug 2020 10:02)  HR: 104 (11 Aug 2020 10:51) (72 - 120)  BP: 126/90 (11 Aug 2020 10:51) (125/91 - 138/93)  RR: 16 (11 Aug 2020 10:09) (16 - 16)  TAS: cephalic presentation  FHR: Category 1  CTX: irregular   SVE: 1/40/-3  EFW 2113

## 2020-08-11 NOTE — OB PROVIDER H&P - NSHPPHYSICALEXAM_GEN_ALL_CORE
Vital Signs Last 24 Hrs  T(C): 37.0 (11 Aug 2020 10:09), Max: 37 (11 Aug 2020 10:02)  T(F): 98.6 (11 Aug 2020 10:09), Max: 98.6 (11 Aug 2020 10:02)  HR: 104 (11 Aug 2020 10:51) (72 - 120)  BP: 126/90 (11 Aug 2020 10:51) (125/91 - 138/93)  RR: 16 (11 Aug 2020 10:09) (16 - 16)  TAS: cephalic presentation  FHR: Category 1  CTX: irregular   SVE: 1/40/-3  EFW 4470

## 2020-08-11 NOTE — OB RN PATIENT PROFILE - PSH
Encounter for IUD removal  2019  H/O arthroscopy  right knee 2013, right shoulder 2007  [place] teeth extracted  2008

## 2020-08-11 NOTE — OB PROVIDER TRIAGE NOTE - HISTORY OF PRESENT ILLNESS
's patient is a 28 y/o   EDC 2020 EGA 40 3/7 sent from office for elevated blood pressure, 147/103. Patient reports of past week having headache. Denies other symptoms associated with PEC. Patient reports of fetal movement. Denies loss of fluid, vaginal bleeding.    Allergies: Denies  Medications: PNV  Medical History: history of kidney stones > 3 years ago   Surgical History:  -right shoulder labrynth repair   -right knee meniscus repair   -wisdom teeth removal   -IUD removal   OBGYN: + HPV 10 years ago

## 2020-08-11 NOTE — OB RN TRIAGE NOTE - CHIEF COMPLAINT QUOTE
from ob's office elevated bp- for induction from ob's office elevated bp- for induction, mild headache x2 days

## 2020-08-11 NOTE — OB PROVIDER H&P - PSH
Encounter for IUD removal  2019  H/O arthroscopy  right knee 2013, right shoulder 2007  [place] teeth extracted  2008

## 2020-08-11 NOTE — OB PROVIDER TRIAGE NOTE - PSH
Encounter for IUD removal  2019  H/O arthroscopy  right knee 2013, right shoulder 2007  [place] teeth extracted  2008

## 2020-08-11 NOTE — OB RN PATIENT PROFILE - NS_BABIESUTERO_OBGYN_ALL_OB_NU
After Visit Summary   3/23/2018    Patrick Diop    MRN: [id number]           Patient Information     Date Of Birth          1939        Visit Information        Provider Department      3/23/2018 9:40 AM Joe Mandel MD [facility]        Today's Diagnoses     Preop general physical exam    -  1    Tongue cancer (H)        Hyperlipidemia with target LDL less than 100        Type 2 diabetes mellitus with stage 1 chronic kidney disease, without long-term current use of insulin (H)        CKD (chronic kidney disease) stage 1, GFR 90 ml/min or greater          Care Instructions      Before Your Surgery      Call your surgeon if there is any change in your health. This includes signs of a cold or flu (such as a sore throat, runny nose, cough, rash or fever).    Do not smoke, drink alcohol or take over the counter medicine (unless your surgeon or primary care doctor tells you to) for the 24 hours before and after surgery.    If you take prescribed drugs: Follow your doctor s orders about which medicines to take and which to stop until after surgery.    Eating and drinking prior to surgery: follow the instructions from your surgeon    Take a shower or bath the night before surgery. Use the soap your surgeon gave you to gently clean your skin. If you do not have soap from your surgeon, use your regular soap. Do not shave or scrub the surgery site.  Wear clean pajamas and have clean sheets on your bed.     RECOMMENDATIONS:     --Patient is to take all scheduled medications on the day of surgery EXCEPT for modifications listed below.    ACE Inhibitor or Angiotensin Receptor Blocker (ARB) Use  Ace inhibitor or Angiotensin Receptor Blocker (ARB) and should HOLD this medication for the 24 hours prior to surgery.  No lisinopril the morning of surgery.       APPROVAL GIVEN to proceed with proposed procedure, without further diagnostic evaluation          Follow-ups after your visit    "     Your next 10 appointments already scheduled     Apr 03, 2018   Procedure with Jj Hernandez MD   [facility] [place], Same Day Surgery (--)    [address]  [place] [address] [phone]            Apr 09, 2018  1:00 PM CDT   (Arrive by 12:45 PM)   Return Visit with Jj Hernandez MD   [facility] ([facility])    [address]  4th Floor  [facility] [id number] [phone]            Sep 07, 2018  8:30 AM CDT   Return Visit with [facility] Provider   [facility] ([facility])    [address], [address]  [facility] [address] [phone]              Who to contact     If you have questions or need follow up information about today's clinic visit or your schedule please contact [facility] directly at [phone].  Normal or non-critical lab and imaging results will be communicated to you by MyChart, letter or phone within 4 business days after the clinic has received the results. If you do not hear from us within 7 days, please contact the clinic through MyChart or phone. If you have a critical or abnormal lab result, we will notify you by phone as soon as possible.  Submit refill requests through [employer] or call your pharmacy and they will forward the refill request to us. Please allow 3 business days for your refill to be completed.          Additional Information About Your Visit        Care EveryWhere ID     This is your Care EveryWhere ID. This could be used by other organizations to access your [place] medical records  [id number]        Your Vitals Were     Pulse Temperature Respirations Height Pulse Oximetry BMI (Body Mass Index)    83 97.2  F (36.2  C) (Tympanic) 20 5' 7.52\" (1.715 m) 99% 26.12 kg/m2       Blood Pressure from Last 3 Encounters:   03/23/18 124/70   03/05/18 167/79   11/29/17 138/80    Weight from Last 3 Encounters:   03/23/18 169 lb 6.4 oz (76.8 kg)   03/19/18 170 lb (77.1 " kg)   03/05/18 163 lb 12.8 oz (74.3 kg)              We Performed the Following     Basic metabolic panel     Hemoglobin A1c     Lipid panel reflex to direct LDL Fasting          Today's Medication Changes          These changes are accurate as of 3/23/18 10:27 AM.  If you have any questions, ask your nurse or doctor.               Stop taking these medicines if you haven't already. Please contact your care team if you have questions.     metFORMIN 1000 MG tablet   Commonly known as:  GLUCOPHAGE   Stopped by:  Joe Mandel MD                    Primary Care Provider Office Phone # Fax #    Joe Mandel [phone] [id number] [address]        Equal Access to Services     [facility]: Hadii frances quinteros hadasho Somisaelali, waaxda luqadaha, qaybta kaalmada adeegyada, elizabeth gruber . So [facility] [phone].    ATENCIÓN: Si habla español, tiene a taylor disposición servicios gratuitos de asistencia lingüística. Llame al [phone].    We comply with applicable federal civil rights laws and [place] laws. We do not discriminate on the basis of race, color, national origin, age, disability, sex, sexual orientation, or gender identity.            Thank you!     Thank you for choosing [facility]  for your care. Our goal is always to provide you with excellent care. Hearing back from our patients is one way we can continue to improve our services. Please take a few minutes to complete the written survey that you may receive in the mail after your visit with us. Thank you!             Your Updated Medication List - Protect others around you: Learn how to safely use, store and throw away your medicines at www.disposemymeds.org.          This list is accurate as of 3/23/18 10:27 AM.  Always use your most recent med list.                   Brand Name Dispense Instructions for use Diagnosis    lisinopril 20 MG tablet    PRINIVIL/ZESTRIL    90  tablet    Take 1 tablet (20 mg) by mouth daily    Hypertension goal BP (blood pressure) < 140/90       lovastatin 40 MG tablet    MEVACOR    90 tablet    TAKE ONE TABLET BY MOUTH NIGHTLY AT BEDTIME    Hyperlipidemia with target LDL less than 100       multivitamin  with lutein Caps per capsule      Take 1 capsule by mouth daily        vitamin D 1000 UNITS capsule      Take 1 capsule by mouth daily           1

## 2020-08-11 NOTE — CHART NOTE - NSCHARTNOTEFT_GEN_A_CORE
Patient was seen and examined at bedside after having 2 blood pressures that were in the severe range 4 hours apart (177/93; 162/80). Repeat blood pressure was 132/32. Patient has no complaints of a headache, changes in vision RUQ pain, chest pain or SOB. Nurse reports that blood pressure may be inaccurate as patients arm was bent when it was measured.     Vital Signs Last 24 Hrs  T(C): 36.5 (11 Aug 2020 19:15), Max: 37 (11 Aug 2020 10:02)  T(F): 97.7 (11 Aug 2020 19:15), Max: 98.6 (11 Aug 2020 10:02)  HR: 81 (11 Aug 2020 20:38) (68 - 120)  BP: 132/62 (11 Aug 2020 20:33) (89/51 - 177/93)  BP(mean): --  RR: 15 (11 Aug 2020 15:24) (15 - 16)  SpO2: 94% (11 Aug 2020 20:38) (91% - 100%)     PE:   CV:RRR   Respiratory: CTA b/l    Abdomen: nontender gravid uterus   Extremities: Bilateral LE swelling, has been present since the beginning of her 3rd trimester          Plan:    Monitor blood pressure  Hellp labs being sent       d/w Dr. Oneil Cunningham, PGY-1

## 2020-08-11 NOTE — OB PROVIDER TRIAGE NOTE - NSOBPROVIDERNOTE_OBGYN_ALL_OB_FT
Admit for induction of labor for gestational hypertension  - for oral cytotec and cervical vigil balloon placement

## 2020-08-11 NOTE — CHART NOTE - NSCHARTNOTEFT_GEN_A_CORE
Delayed note entry due to patient care. Patient seen and examined at 11:30PM     R1 Labor Note    S: Patient evaluated at bedside for cervical change because she was feeling more intense contractions.       SVE:4/60/-3   EFM: Category 1  Orlovista:  q 3 minutes      A/P 29y presenting for IOL  -Continue with induction  -Cat 1 tracing  -Anticipate     d/w Dr. Oneil Cunningham PGY-1

## 2020-08-12 ENCOUNTER — TRANSCRIPTION ENCOUNTER (OUTPATIENT)
Age: 30
End: 2020-08-12

## 2020-08-12 LAB
ALBUMIN SERPL ELPH-MCNC: 2.8 G/DL — LOW (ref 3.3–5)
ALP SERPL-CCNC: 289 U/L — HIGH (ref 40–120)
ALT FLD-CCNC: 11 U/L — SIGNIFICANT CHANGE UP (ref 4–33)
ANION GAP SERPL CALC-SCNC: 15 MMO/L — HIGH (ref 7–14)
APTT BLD: 25.8 SEC — LOW (ref 27–36.3)
AST SERPL-CCNC: 32 U/L — SIGNIFICANT CHANGE UP (ref 4–32)
BASOPHILS # BLD AUTO: 0.02 K/UL — SIGNIFICANT CHANGE UP (ref 0–0.2)
BASOPHILS NFR BLD AUTO: 0.1 % — SIGNIFICANT CHANGE UP (ref 0–2)
BILIRUB SERPL-MCNC: < 0.2 MG/DL — LOW (ref 0.2–1.2)
BUN SERPL-MCNC: 7 MG/DL — SIGNIFICANT CHANGE UP (ref 7–23)
CALCIUM SERPL-MCNC: 8.6 MG/DL — SIGNIFICANT CHANGE UP (ref 8.4–10.5)
CHLORIDE SERPL-SCNC: 104 MMOL/L — SIGNIFICANT CHANGE UP (ref 98–107)
CO2 SERPL-SCNC: 19 MMOL/L — LOW (ref 22–31)
CREAT SERPL-MCNC: 0.68 MG/DL — SIGNIFICANT CHANGE UP (ref 0.5–1.3)
EOSINOPHIL # BLD AUTO: 0 K/UL — SIGNIFICANT CHANGE UP (ref 0–0.5)
EOSINOPHIL NFR BLD AUTO: 0 % — SIGNIFICANT CHANGE UP (ref 0–6)
FIBRINOGEN PPP-MCNC: 571 MG/DL — HIGH (ref 290–520)
GLUCOSE SERPL-MCNC: 123 MG/DL — HIGH (ref 70–99)
HCT VFR BLD CALC: 32.3 % — LOW (ref 34.5–45)
HGB BLD-MCNC: 11.1 G/DL — LOW (ref 11.5–15.5)
IMM GRANULOCYTES NFR BLD AUTO: 0.7 % — SIGNIFICANT CHANGE UP (ref 0–1.5)
INR BLD: 1.05 — SIGNIFICANT CHANGE UP (ref 0.88–1.16)
LDH SERPL L TO P-CCNC: 379 U/L — HIGH (ref 135–225)
LYMPHOCYTES # BLD AUTO: 1.36 K/UL — SIGNIFICANT CHANGE UP (ref 1–3.3)
LYMPHOCYTES # BLD AUTO: 7.7 % — LOW (ref 13–44)
MCHC RBC-ENTMCNC: 31.9 PG — SIGNIFICANT CHANGE UP (ref 27–34)
MCHC RBC-ENTMCNC: 34.4 % — SIGNIFICANT CHANGE UP (ref 32–36)
MCV RBC AUTO: 92.8 FL — SIGNIFICANT CHANGE UP (ref 80–100)
MONOCYTES # BLD AUTO: 1.1 K/UL — HIGH (ref 0–0.9)
MONOCYTES NFR BLD AUTO: 6.2 % — SIGNIFICANT CHANGE UP (ref 2–14)
NEUTROPHILS # BLD AUTO: 15.01 K/UL — HIGH (ref 1.8–7.4)
NEUTROPHILS NFR BLD AUTO: 85.3 % — HIGH (ref 43–77)
NRBC # FLD: 0 K/UL — SIGNIFICANT CHANGE UP (ref 0–0)
PLATELET # BLD AUTO: 238 K/UL — SIGNIFICANT CHANGE UP (ref 150–400)
PMV BLD: 10.9 FL — SIGNIFICANT CHANGE UP (ref 7–13)
POTASSIUM SERPL-MCNC: 4.2 MMOL/L — SIGNIFICANT CHANGE UP (ref 3.5–5.3)
POTASSIUM SERPL-SCNC: 4.2 MMOL/L — SIGNIFICANT CHANGE UP (ref 3.5–5.3)
PROT SERPL-MCNC: 5.3 G/DL — LOW (ref 6–8.3)
PROTHROM AB SERPL-ACNC: 12 SEC — SIGNIFICANT CHANGE UP (ref 10.6–13.6)
RBC # BLD: 3.48 M/UL — LOW (ref 3.8–5.2)
RBC # FLD: 14.5 % — SIGNIFICANT CHANGE UP (ref 10.3–14.5)
SODIUM SERPL-SCNC: 138 MMOL/L — SIGNIFICANT CHANGE UP (ref 135–145)
URATE SERPL-MCNC: 4.7 MG/DL — SIGNIFICANT CHANGE UP (ref 2.5–7)
WBC # BLD: 17.61 K/UL — HIGH (ref 3.8–10.5)
WBC # FLD AUTO: 17.61 K/UL — HIGH (ref 3.8–10.5)

## 2020-08-12 PROCEDURE — 59410 OBSTETRICAL CARE: CPT

## 2020-08-12 RX ORDER — ACETAMINOPHEN 500 MG
3 TABLET ORAL
Qty: 0 | Refills: 0 | DISCHARGE
Start: 2020-08-12

## 2020-08-12 RX ORDER — ERTAPENEM SODIUM 1 G/1
1000 INJECTION, POWDER, LYOPHILIZED, FOR SOLUTION INTRAMUSCULAR; INTRAVENOUS ONCE
Refills: 0 | Status: COMPLETED | OUTPATIENT
Start: 2020-08-12 | End: 2020-08-12

## 2020-08-12 RX ORDER — CALCIUM CARBONATE 500(1250)
0 TABLET ORAL
Qty: 0 | Refills: 0 | DISCHARGE

## 2020-08-12 RX ORDER — IBUPROFEN 200 MG
1 TABLET ORAL
Qty: 0 | Refills: 0 | DISCHARGE
Start: 2020-08-12

## 2020-08-12 RX ORDER — OXYCODONE HYDROCHLORIDE 5 MG/1
5 TABLET ORAL
Refills: 0 | Status: DISCONTINUED | OUTPATIENT
Start: 2020-08-12 | End: 2020-08-15

## 2020-08-12 RX ORDER — DIBUCAINE 1 %
1 OINTMENT (GRAM) RECTAL EVERY 6 HOURS
Refills: 0 | Status: DISCONTINUED | OUTPATIENT
Start: 2020-08-12 | End: 2020-08-15

## 2020-08-12 RX ORDER — LANOLIN
1 OINTMENT (GRAM) TOPICAL EVERY 6 HOURS
Refills: 0 | Status: DISCONTINUED | OUTPATIENT
Start: 2020-08-12 | End: 2020-08-15

## 2020-08-12 RX ORDER — IBUPROFEN 200 MG
600 TABLET ORAL EVERY 6 HOURS
Refills: 0 | Status: COMPLETED | OUTPATIENT
Start: 2020-08-12 | End: 2021-07-11

## 2020-08-12 RX ORDER — TETANUS TOXOID, REDUCED DIPHTHERIA TOXOID AND ACELLULAR PERTUSSIS VACCINE, ADSORBED 5; 2.5; 8; 8; 2.5 [IU]/.5ML; [IU]/.5ML; UG/.5ML; UG/.5ML; UG/.5ML
0.5 SUSPENSION INTRAMUSCULAR ONCE
Refills: 0 | Status: DISCONTINUED | OUTPATIENT
Start: 2020-08-12 | End: 2020-08-15

## 2020-08-12 RX ORDER — HYDROCORTISONE 1 %
1 OINTMENT (GRAM) TOPICAL EVERY 6 HOURS
Refills: 0 | Status: DISCONTINUED | OUTPATIENT
Start: 2020-08-12 | End: 2020-08-15

## 2020-08-12 RX ORDER — IBUPROFEN 200 MG
600 TABLET ORAL EVERY 6 HOURS
Refills: 0 | Status: DISCONTINUED | OUTPATIENT
Start: 2020-08-12 | End: 2020-08-15

## 2020-08-12 RX ORDER — AER TRAVELER 0.5 G/1
1 SOLUTION RECTAL; TOPICAL EVERY 4 HOURS
Refills: 0 | Status: DISCONTINUED | OUTPATIENT
Start: 2020-08-12 | End: 2020-08-15

## 2020-08-12 RX ORDER — OXYCODONE HYDROCHLORIDE 5 MG/1
5 TABLET ORAL ONCE
Refills: 0 | Status: DISCONTINUED | OUTPATIENT
Start: 2020-08-12 | End: 2020-08-15

## 2020-08-12 RX ORDER — BENZOCAINE 10 %
1 GEL (GRAM) MUCOUS MEMBRANE EVERY 6 HOURS
Refills: 0 | Status: DISCONTINUED | OUTPATIENT
Start: 2020-08-12 | End: 2020-08-15

## 2020-08-12 RX ORDER — SIMETHICONE 80 MG/1
80 TABLET, CHEWABLE ORAL EVERY 4 HOURS
Refills: 0 | Status: DISCONTINUED | OUTPATIENT
Start: 2020-08-12 | End: 2020-08-15

## 2020-08-12 RX ORDER — PRAMOXINE HYDROCHLORIDE 150 MG/15G
1 AEROSOL, FOAM RECTAL EVERY 4 HOURS
Refills: 0 | Status: DISCONTINUED | OUTPATIENT
Start: 2020-08-12 | End: 2020-08-15

## 2020-08-12 RX ORDER — ACETAMINOPHEN 500 MG
975 TABLET ORAL
Refills: 0 | Status: DISCONTINUED | OUTPATIENT
Start: 2020-08-12 | End: 2020-08-15

## 2020-08-12 RX ORDER — DIPHENHYDRAMINE HCL 50 MG
25 CAPSULE ORAL EVERY 6 HOURS
Refills: 0 | Status: DISCONTINUED | OUTPATIENT
Start: 2020-08-12 | End: 2020-08-15

## 2020-08-12 RX ORDER — KETOROLAC TROMETHAMINE 30 MG/ML
30 SYRINGE (ML) INJECTION ONCE
Refills: 0 | Status: DISCONTINUED | OUTPATIENT
Start: 2020-08-12 | End: 2020-08-12

## 2020-08-12 RX ORDER — MAGNESIUM HYDROXIDE 400 MG/1
30 TABLET, CHEWABLE ORAL
Refills: 0 | Status: DISCONTINUED | OUTPATIENT
Start: 2020-08-12 | End: 2020-08-15

## 2020-08-12 RX ORDER — OXYTOCIN 10 UNIT/ML
333.33 VIAL (ML) INJECTION
Qty: 20 | Refills: 0 | Status: DISCONTINUED | OUTPATIENT
Start: 2020-08-12 | End: 2020-08-12

## 2020-08-12 RX ADMIN — Medication 975 MILLIGRAM(S): at 15:18

## 2020-08-12 RX ADMIN — Medication 600 MILLIGRAM(S): at 20:45

## 2020-08-12 RX ADMIN — Medication 0.25 MILLIGRAM(S): at 02:37

## 2020-08-12 RX ADMIN — SODIUM CHLORIDE 3 MILLILITER(S): 9 INJECTION INTRAMUSCULAR; INTRAVENOUS; SUBCUTANEOUS at 22:10

## 2020-08-12 RX ADMIN — ERTAPENEM SODIUM 120 MILLIGRAM(S): 1 INJECTION, POWDER, LYOPHILIZED, FOR SOLUTION INTRAMUSCULAR; INTRAVENOUS at 06:54

## 2020-08-12 RX ADMIN — Medication 975 MILLIGRAM(S): at 16:00

## 2020-08-12 RX ADMIN — Medication 30 MILLIGRAM(S): at 07:10

## 2020-08-12 RX ADMIN — Medication 600 MILLIGRAM(S): at 20:15

## 2020-08-12 RX ADMIN — Medication 975 MILLIGRAM(S): at 08:00

## 2020-08-12 RX ADMIN — Medication 1 TABLET(S): at 15:18

## 2020-08-12 RX ADMIN — Medication 975 MILLIGRAM(S): at 21:35

## 2020-08-12 RX ADMIN — Medication 1000 MILLIUNIT(S)/MIN: at 06:54

## 2020-08-12 RX ADMIN — Medication 30 MILLIGRAM(S): at 06:55

## 2020-08-12 RX ADMIN — Medication 975 MILLIGRAM(S): at 22:10

## 2020-08-12 RX ADMIN — Medication 975 MILLIGRAM(S): at 07:30

## 2020-08-12 NOTE — OB PROVIDER DELIVERY SUMMARY - NSPROVIDERDELIVERYNOTE_OBGYN_ALL_OB_FT
Patient fully dilated and delivered over itnact perineum. Viable male infant. Waited 30 minutes and placenta still did not come. Manual removal of placenta. Placenta intact and uterus examined and completely clean of any placental remnants.  Cervix/vagina and rectum intact. First degree laceration repaired with 2-0 chromic

## 2020-08-12 NOTE — PROVIDER CONTACT NOTE (CHANGE IN STATUS NOTIFICATION) - ASSESSMENT
pt BPs 150s/70s.  Pt history of headache at 7:30am treated with Tylenol, denies visual changes and/or epigastric pain.

## 2020-08-12 NOTE — DISCHARGE NOTE OB - MEDICATION SUMMARY - MEDICATIONS TO TAKE
I will START or STAY ON the medications listed below when I get home from the hospital:    ibuprofen 600 mg oral tablet  -- 1 tab(s) by mouth every 6 hours  -- Indication: For Labor and delivery, indication for care    acetaminophen 325 mg oral tablet  -- 3 tab(s) by mouth   -- Indication: For Labor and delivery, indication for care I will START or STAY ON the medications listed below when I get home from the hospital:    acetaminophen 325 mg oral tablet  -- 3 tab(s) by mouth every 6 hours, As Needed  -- Indication: For pain    ibuprofen 600 mg oral tablet  -- 1 tab(s) by mouth every 6 hours, As Needed  -- Indication: For pain    Procardia XL 30 mg oral tablet, extended release  -- 1 tab(s) by mouth once a day   -- Avoid grapefruit and grapefruit juice while taking this medication.  It is very important that you take or use this exactly as directed.  Do not skip doses or discontinue unless directed by your doctor.  Some non-prescription drugs may aggravate your condition.  Read all labels carefully.  If a warning appears, check with your doctor before taking.  Swallow whole.  Do not crush.    -- Indication: For blood pressure    NIFEdipine 30 mg oral tablet, extended release  -- 1 tab(s) by mouth once a day  -- Indication: For blood pressure    Prenatal Multivitamins with Folic Acid 1 mg oral tablet  -- 1 tab(s) by mouth once a day  -- Indication: For postpartum

## 2020-08-12 NOTE — OB RN DELIVERY SUMMARY - NS_SEPSISRSKCALC_OBGYN_ALL_OB_FT
EOS calculated successfully. EOS Risk Factor: 0.5/1000 live births ([facility] national incidence); GA=40w4d; Temp=99.14; ROM=5.65; GBS='Negative'; Antibiotics='No antibiotics or any antibiotics < 2 hrs prior to birth'

## 2020-08-12 NOTE — DISCHARGE NOTE OB - PATIENT PORTAL LINK FT
You can access the FollowMyHealth Patient Portal offered by [facility] by registering at the following website: [email or web address]. By joining [employer]’s FollowMyHealth portal, you will also be able to view your health information using other applications (apps) compatible with our system.

## 2020-08-12 NOTE — DISCHARGE NOTE OB - MATERIALS PROVIDED
Vaccinations/Breastfeeding Log/Guide to Postpartum Care/Tdap Vaccination (VIS Pub Date: 2012)/  Immunization Record/[facility] [place] Screening Program/Birth Certificate Instructions/[facility] Hearing Screen Program/Shaken Baby Prevention Handout

## 2020-08-12 NOTE — DISCHARGE NOTE OB - HOSPITAL COURSE
Admitted for induction for gestational hypertension. REceived cervical balloon and PO cytotec and progressed to fully dilated and delivered viable male infant. Manual removal of placenta for retained placenta. First degree laceration Admitted for induction for gestational hypertension. REceived cervical balloon and PO cytotec and progressed to fully dilated and delivered viable male infant. Manual removal of placenta for retained placenta. First degree laceration.   S/p Magnesium, started on procardia 30 mg XL

## 2020-08-12 NOTE — CHART NOTE - NSCHARTNOTEFT_GEN_A_CORE
Patient very uncomfortable  ctx q1-2 min  FHT variables, ?change of baseline to 110.   Terbutaline given and FHR settled down to baseline 120's and category 1  VE 8/90/-1  will get anesthesia to give her a top off

## 2020-08-12 NOTE — CHART NOTE - NSCHARTNOTEFT_GEN_A_CORE
R2 Labor Note    S: Patient evaluated at bedside for cervical change. Patient noted to have variable decels after SROM. IUPC placed without complication, amnioinfusion started.     O:  T(C): 36.9 (20 @ 23:31), Max: 37 (20 @ 10:02)  HR: 102 (20 @ 01:15) (68 - 120)  BP: 127/74 (20 @ 01:04) (89/51 - 177/93)  RR: 15 (20 @ 15:24) (15 - 16)  SpO2: 99% (20 @ 01:15) (90% - 100%)    SVE: 6/80/-2  EFM: 130 bpm, mod ne, +accels, persistent variable decelerations  [place]:  cx q2min      A/P 29y P0 @40.3wks IOL gHTN.   -IOL: for pitocin  -Cat 2 tracing. Will continue to resuscitate with amnioinfusion. IUPC in place and working.   -GBS neg  -EFW 3629g  -COVID neg  -Analgesia epidural in place  -Anticipate     d/w Dr. Altmann Robyn Frankel PGY-2

## 2020-08-12 NOTE — DISCHARGE NOTE OB - CARE PROVIDER_API CALL
Rosas Dyer  OBS-GYN - GENERAL  [address] [place] [address]  Phone: [phone]  Fax: [phone]  Follow Up Time:

## 2020-08-12 NOTE — OB NEONATOLOGY/PEDIATRICIAN DELIVERY SUMMARY - NSPEDSNEONOTESA_OBGYN_ALL_OB_FT
Baby is a 40.3 wk GA M born to a 30 y/o  mother via . Called for meconium and cat II. Maternal history of right knee surgery, right shoulder surgery, kidney stones, wisdom tooth removal, HPV. Prenatal history uncomplicated. Maternal blood type B+. Prenatal labs negative, non-reactive, and immune. GBS negative on . SROM at 00:05 on , light meconium fluids. Baby born vigorous and crying spontaneously. Cord clamping delayed 1 min. Warmed, dried, stimulated. Apgars 9/9. EOS 0.19. Mom plans to breastfeed, consents hepB and circ.

## 2020-08-13 LAB
ALBUMIN SERPL ELPH-MCNC: 2.9 G/DL — LOW (ref 3.3–5)
ALP SERPL-CCNC: 254 U/L — HIGH (ref 40–120)
ALT FLD-CCNC: 15 U/L — SIGNIFICANT CHANGE UP (ref 4–33)
ANION GAP SERPL CALC-SCNC: 11 MMO/L — SIGNIFICANT CHANGE UP (ref 7–14)
APTT BLD: 26.6 SEC — LOW (ref 27–36.3)
AST SERPL-CCNC: 35 U/L — HIGH (ref 4–32)
BASOPHILS # BLD AUTO: 0.04 K/UL — SIGNIFICANT CHANGE UP (ref 0–0.2)
BASOPHILS NFR BLD AUTO: 0.3 % — SIGNIFICANT CHANGE UP (ref 0–2)
BILIRUB SERPL-MCNC: < 0.2 MG/DL — LOW (ref 0.2–1.2)
BUN SERPL-MCNC: 16 MG/DL — SIGNIFICANT CHANGE UP (ref 7–23)
CALCIUM SERPL-MCNC: 8.7 MG/DL — SIGNIFICANT CHANGE UP (ref 8.4–10.5)
CHLORIDE SERPL-SCNC: 104 MMOL/L — SIGNIFICANT CHANGE UP (ref 98–107)
CO2 SERPL-SCNC: 21 MMOL/L — LOW (ref 22–31)
CREAT SERPL-MCNC: 0.88 MG/DL — SIGNIFICANT CHANGE UP (ref 0.5–1.3)
EOSINOPHIL # BLD AUTO: 0.07 K/UL — SIGNIFICANT CHANGE UP (ref 0–0.5)
EOSINOPHIL NFR BLD AUTO: 0.5 % — SIGNIFICANT CHANGE UP (ref 0–6)
FIBRINOGEN PPP-MCNC: 597 MG/DL — HIGH (ref 290–520)
GLUCOSE SERPL-MCNC: 91 MG/DL — SIGNIFICANT CHANGE UP (ref 70–99)
HCT VFR BLD CALC: 31.3 % — LOW (ref 34.5–45)
HGB BLD-MCNC: 10.5 G/DL — LOW (ref 11.5–15.5)
IMM GRANULOCYTES NFR BLD AUTO: 0.7 % — SIGNIFICANT CHANGE UP (ref 0–1.5)
INR BLD: 1.05 — SIGNIFICANT CHANGE UP (ref 0.88–1.16)
LDH SERPL L TO P-CCNC: 326 U/L — HIGH (ref 135–225)
LYMPHOCYTES # BLD AUTO: 19 % — SIGNIFICANT CHANGE UP (ref 13–44)
LYMPHOCYTES # BLD AUTO: 2.58 K/UL — SIGNIFICANT CHANGE UP (ref 1–3.3)
MAGNESIUM SERPL-MCNC: 4.8 MG/DL — HIGH (ref 1.6–2.6)
MCHC RBC-ENTMCNC: 31 PG — SIGNIFICANT CHANGE UP (ref 27–34)
MCHC RBC-ENTMCNC: 33.5 % — SIGNIFICANT CHANGE UP (ref 32–36)
MCV RBC AUTO: 92.3 FL — SIGNIFICANT CHANGE UP (ref 80–100)
MONOCYTES # BLD AUTO: 0.89 K/UL — SIGNIFICANT CHANGE UP (ref 0–0.9)
MONOCYTES NFR BLD AUTO: 6.5 % — SIGNIFICANT CHANGE UP (ref 2–14)
NEUTROPHILS # BLD AUTO: 9.91 K/UL — HIGH (ref 1.8–7.4)
NEUTROPHILS NFR BLD AUTO: 73 % — SIGNIFICANT CHANGE UP (ref 43–77)
NRBC # FLD: 0 K/UL — SIGNIFICANT CHANGE UP (ref 0–0)
PLATELET # BLD AUTO: 275 K/UL — SIGNIFICANT CHANGE UP (ref 150–400)
PMV BLD: 10.4 FL — SIGNIFICANT CHANGE UP (ref 7–13)
POTASSIUM SERPL-MCNC: 4.6 MMOL/L — SIGNIFICANT CHANGE UP (ref 3.5–5.3)
POTASSIUM SERPL-SCNC: 4.6 MMOL/L — SIGNIFICANT CHANGE UP (ref 3.5–5.3)
PROT SERPL-MCNC: 5.6 G/DL — LOW (ref 6–8.3)
PROTHROM AB SERPL-ACNC: 11.9 SEC — SIGNIFICANT CHANGE UP (ref 10.6–13.6)
RBC # BLD: 3.39 M/UL — LOW (ref 3.8–5.2)
RBC # FLD: 14.8 % — HIGH (ref 10.3–14.5)
SODIUM SERPL-SCNC: 136 MMOL/L — SIGNIFICANT CHANGE UP (ref 135–145)
URATE SERPL-MCNC: 4.5 MG/DL — SIGNIFICANT CHANGE UP (ref 2.5–7)
WBC # BLD: 13.59 K/UL — HIGH (ref 3.8–10.5)
WBC # FLD AUTO: 13.59 K/UL — HIGH (ref 3.8–10.5)

## 2020-08-13 RX ORDER — MAGNESIUM SULFATE 500 MG/ML
2 VIAL (ML) INJECTION
Qty: 40 | Refills: 0 | Status: DISCONTINUED | OUTPATIENT
Start: 2020-08-13 | End: 2020-08-14

## 2020-08-13 RX ORDER — SODIUM CHLORIDE 9 MG/ML
1000 INJECTION, SOLUTION INTRAVENOUS
Refills: 0 | Status: DISCONTINUED | OUTPATIENT
Start: 2020-08-13 | End: 2020-08-15

## 2020-08-13 RX ORDER — MAGNESIUM SULFATE 500 MG/ML
4 VIAL (ML) INJECTION ONCE
Refills: 0 | Status: COMPLETED | OUTPATIENT
Start: 2020-08-13 | End: 2020-08-13

## 2020-08-13 RX ADMIN — Medication 50 GM/HR: at 15:13

## 2020-08-13 RX ADMIN — Medication 200 GRAM(S): at 14:50

## 2020-08-13 RX ADMIN — Medication 975 MILLIGRAM(S): at 13:04

## 2020-08-13 RX ADMIN — Medication 975 MILLIGRAM(S): at 06:48

## 2020-08-13 RX ADMIN — Medication 975 MILLIGRAM(S): at 06:18

## 2020-08-13 RX ADMIN — Medication 50 GM/HR: at 19:10

## 2020-08-13 RX ADMIN — Medication 600 MILLIGRAM(S): at 09:27

## 2020-08-13 RX ADMIN — Medication 1 TABLET(S): at 12:18

## 2020-08-13 RX ADMIN — Medication 975 MILLIGRAM(S): at 12:19

## 2020-08-13 RX ADMIN — Medication 975 MILLIGRAM(S): at 18:45

## 2020-08-13 RX ADMIN — Medication 975 MILLIGRAM(S): at 17:50

## 2020-08-13 RX ADMIN — Medication 600 MILLIGRAM(S): at 03:50

## 2020-08-13 RX ADMIN — Medication 600 MILLIGRAM(S): at 10:00

## 2020-08-13 RX ADMIN — SODIUM CHLORIDE 2000 MILLILITER(S): 9 INJECTION, SOLUTION INTRAVENOUS at 14:49

## 2020-08-13 RX ADMIN — Medication 600 MILLIGRAM(S): at 03:16

## 2020-08-13 NOTE — CHART NOTE - NSCHARTNOTEFT_GEN_A_CORE
R1 Chart note    Examined pt at bedside due to elevated BPs. Pt states that she has a headache on and off that just started again about 30 mins ago. She denies visual disturbances, CP, SOB, abdominal pain, or fevers.       Gen NAD  Abd Soft, Non-tender  Ext: trace edema    Vital Signs Last 24 Hrs  T(C): 36.8 (13 Aug 2020 13:24), Max: 37 (12 Aug 2020 17:25)  T(F): 98.2 (13 Aug 2020 13:), Max: 98.6 (12 Aug 2020 17:25)  HR: 77 (13 Aug 2020 13:) (75 - 93)  BP: 140/88 (13 Aug 2020 13:) (124/74 - 153/93)  BP(mean): --  RR: 18 (13 Aug 2020 13:24) (16 - 18)  SpO2: 100% (13 Aug 2020 13:24) (98% - 100%)               11.1   17.61 )-----------( 238      ( 12 @ 08:30 )             32.3                11.2   11.73 )-----------( 241      ( 11 @ 20:40 )             35.0                12.7   9.04  )-----------( 287      ( 11 @ 10:10 )             37.1       A/P  28yo PPD1  with elevated BPs and continued headache now meeting criteria for Pre-eclampsia  - start Mg  - draw HELLP labs  - Tylenol for headache  - routine postpartum care  - monitor BPs    d/w Dr. Shona Pena, PGY1 R1 Chart note    Examined pt at bedside due to elevated BPs. Pt states that she has a headache on and off that just started again about 30 mins ago. She denies visual disturbances, CP, SOB, abdominal pain, or fevers.       Gen NAD  Abd Soft, Non-tender  Ext: trace edema    Vital Signs Last 24 Hrs  T(C): 36.8 (13 Aug 2020 13:24), Max: 37 (12 Aug 2020 17:25)  T(F): 98.2 (13 Aug 2020 13:), Max: 98.6 (12 Aug 2020 17:25)  HR: 77 (13 Aug 2020 13:) (75 - 93)  BP: 140/88 (13 Aug 2020 13:) (124/74 - 153/93)  BP(mean): --  RR: 18 (13 Aug 2020 13:24) (16 - 18)  SpO2: 100% (13 Aug 2020 13:24) (98% - 100%)               11.1   17.61 )-----------( 238      ( 12 @ 08:30 )             32.3                11.2   11.73 )-----------( 241      ( 11 @ 20:40 )             35.0                12.7   9.04  )-----------( 287      ( 11 @ 10:10 )             37.1       A/P  30yo PPD1  with elevated BPs and continued headache now meeting criteria for severe Pre-eclampsia  - start Mg  - draw HELLP labs  - Tylenol for headache  - routine postpartum care  - monitor BPs    d/w Dr. Shona Pena, PGY1

## 2020-08-13 NOTE — PROGRESS NOTE ADULT - PROBLEM SELECTOR PLAN 1
- afebrile, s/p Invanz x1  - Pain well controlled, continue current pain regimen  - Increase ambulation, SCDs when not ambulating  - Continue regular diet    Erna Vitale MD PGY1

## 2020-08-13 NOTE — PROGRESS NOTE ADULT - ASSESSMENT
A/P: 28yo gHTN PPD#1 s/p  with manual extraction of placenta. Patient is stable and doing well post-partum.

## 2020-08-13 NOTE — PROGRESS NOTE ADULT - ATTENDING COMMENTS
I examined and evaluated the patient  I agree with the documentation with the following edits and additions:     Patient offers no complaints  Today patient denies HA,  blurry vision, RUQ pain  Reports a headache that has resolved    Vital Signs Last 24 Hrs  T(C): 36.6 (13 Aug 2020 09:30), Max: 37 (12 Aug 2020 17:25)  T(F): 97.9 (13 Aug 2020 09:30), Max: 98.6 (12 Aug 2020 17:25)  HR: 77 (13 Aug 2020 09:30) (75 - 93)  BP: 136/83 (13 Aug 2020 09:30) (124/74 - 153/93)  RR: 18 (13 Aug 2020 09:30) (16 - 18)  SpO2: 98% (13 Aug 2020 09:30) (98% - 100%)    Gen: NAD, A&O x 3  Breast: b/l breast symmetrical with no nipple abnormalities, non engorged  Fundus: Firm  Abd: soft, appropriately tender, non distended  VE: Mild lochia rubra  Ext: 1+ b/l edema, no calf tenderness b/l                          11.1   17.61 )-----------( 238      ( 12 Aug 2020 08:30 )               32.3       08-12    138  |  104  |  7   ----------------------------<  123<H>  4.2   |  19<L>  |  0.68    Ca    8.6      12 Aug 2020 08:30    TPro  5.3<L>  /  Alb  2.8<L>  /  TBili  < 0.2<L>  /  DBili  x   /  AST  32  /  ALT  11  /  AlkPhos  289<H>  08-12      Assessment: 30yo  s/p NVD  Course complicated by gHTN. BPs are labile  HELLP labs wnl    Patient requested discharge today.  However I would like to monitor her BPs longer    She reported she has a BP cuff at home and can monitor her BP at home  She agreed to monitoring till this afternoon  Signs and Symptoms of PEC reviewed with patient    Plan  -Monitor BPs.  If remains stable will consider discharge in PM  -Contacted case management to offer home BP monitoring  -Continue routine postpartum care    KD Nagel MD KYLE FACOG I examined and evaluated the patient  I agree with the documentation with the following edits and additions:     Patient offers no complaints  Today patient denies HA,  blurry vision, RUQ pain  Reports a headache that has resolved    Vital Signs Last 24 Hrs  T(C): 36.6 (13 Aug 2020 09:30), Max: 37 (12 Aug 2020 17:25)  T(F): 97.9 (13 Aug 2020 09:30), Max: 98.6 (12 Aug 2020 17:25)  HR: 77 (13 Aug 2020 09:30) (75 - 93)  BP: 136/83 (13 Aug 2020 09:30) (124/74 - 153/93)  RR: 18 (13 Aug 2020 09:30) (16 - 18)  SpO2: 98% (13 Aug 2020 09:30) (98% - 100%)    Gen: NAD, A&O x 3  Breast: b/l breast symmetrical with no nipple abnormalities, non engorged  Fundus: Firm  Abd: soft, appropriately tender, non distended  VE: Mild lochia rubra  Ext: 1+ b/l edema, no calf tenderness b/l                          11.1   17.61 )-----------( 238      ( 12 Aug 2020 08:30 )               32.3       08-12    138  |  104  |  7   ----------------------------<  123<H>  4.2   |  19<L>  |  0.68    Ca    8.6      12 Aug 2020 08:30    TPro  5.3<L>  /  Alb  2.8<L>  /  TBili  < 0.2<L>  /  DBili  x   /  AST  32  /  ALT  11  /  AlkPhos  289<H>  08-12      Assessment: 30yo  s/p NVD  Course complicated by gHTN. BPs are labile  HELLP labs wnl    Patient requested discharge today.  However I would like to monitor her BPs longer    She reported she has a BP cuff at home and can monitor her BP at home  She agreed to monitoring till this afternoon  Signs and Symptoms of PEC reviewed with patient    Plan  -Monitor BPs.  If remains stable will consider discharge in PM  -Contacted case management to offer home BP monitoring  -Continue routine postpartum care  -Reassess in PM    KD Nagel MD KYLE FACOG

## 2020-08-13 NOTE — PROGRESS NOTE ADULT - SUBJECTIVE AND OBJECTIVE BOX
OB Progress Note:  PPD#1    S: 28yo gHTN PPD#1 s/p  with manual extraction of placenta. Patient feels well. Pain is well controlled. She is tolerating a regular diet and passing flatus. She is voiding spontaneously, and ambulating without difficulty. Denies CP/SOB. Denies lightheadedness/dizziness. Denies N/V.    O:  Vitals:  Vital Signs Last 24 Hrs  T(C): 36.6 (13 Aug 2020 05:59), Max: 37 (12 Aug 2020 17:25)  T(F): 97.9 (13 Aug 2020 05:59), Max: 98.6 (12 Aug 2020 17:25)  HR: 75 (13 Aug 2020 05:59) (75 - 93)  BP: 141/86 (13 Aug 2020 05:59) (124/74 - 153/93)  BP(mean): --  RR: 18 (13 Aug 2020 05:59) (16 - 18)  SpO2: 100% (13 Aug 2020 05:59) (98% - 100%)    MEDICATIONS  (STANDING):  acetaminophen   Tablet .. 975 milliGRAM(s) Oral <User Schedule>  diphtheria/tetanus/pertussis (acellular) Vaccine (ADAcel) 0.5 milliLiter(s) IntraMuscular once  ibuprofen  Tablet. 600 milliGRAM(s) Oral every 6 hours  lactated ringers Bolus 1000 milliLiter(s) IV Bolus once  prenatal multivitamin 1 Tablet(s) Oral daily  sodium chloride 0.9% lock flush 3 milliLiter(s) IV Push every 8 hours      Labs:  Blood type: B Positive  Rubella IgG: RPR: Negative                          11.1<L>   17.61<H> >-----------< 238    (  @ 08:30 )             32.3<L>                        11.2<L>   11.73<H> >-----------< 241    (  @ 20:40 )             35.0                        12.7   9.04 >-----------< 287    (  @ 10:10 )             37.1    20 @ 08:30      138  |  104  |  7   ----------------------------<  123<H>  4.2   |  19<L>  |  0.68    20 @ 20:40      136  |  105  |  9   ----------------------------<  84  4.0   |  17<L>  |  0.61    20 @ 10:10      136  |  103  |  10  ----------------------------<  85  4.7   |  19<L>  |  0.64        Ca    8.6      12 Aug 2020 08:30  Ca    8.7      11 Aug 2020 20:40  Ca    9.5      11 Aug 2020 10:10    TPro  5.3<L>  /  Alb  2.8<L>  /  TBili  < 0.2<L>  /  DBili  x   /  AST  32  /  ALT  11  /  AlkPhos  289<H>  20 @ 08:30  TPro  5.6<L>  /  Alb  3.0<L>  /  TBili  0.2  /  DBili  x   /  AST  22  /  ALT  12  /  AlkPhos  296<H>  20 @ 20:40  TPro  6.3  /  Alb  3.3  /  TBili  0.2  /  DBili  x   /  AST  27  /  ALT  12  /  AlkPhos  326<H>  20 @ 10:10    Physical Exam:  General: NAD  Abdomen: soft, non-tender, non-distended, fundus firm  Vaginal: Lochia wnl  Extremities: No erythema/edema

## 2020-08-14 LAB
ALBUMIN SERPL ELPH-MCNC: 3.3 G/DL — SIGNIFICANT CHANGE UP (ref 3.3–5)
ALBUMIN SERPL ELPH-MCNC: 3.4 G/DL — SIGNIFICANT CHANGE UP (ref 3.3–5)
ALP SERPL-CCNC: 259 U/L — HIGH (ref 40–120)
ALP SERPL-CCNC: 294 U/L — HIGH (ref 40–120)
ALT FLD-CCNC: 13 U/L — SIGNIFICANT CHANGE UP (ref 4–33)
ALT FLD-CCNC: 19 U/L — SIGNIFICANT CHANGE UP (ref 4–33)
ANION GAP SERPL CALC-SCNC: 15 MMO/L — HIGH (ref 7–14)
ANION GAP SERPL CALC-SCNC: 16 MMO/L — HIGH (ref 7–14)
AST SERPL-CCNC: 32 U/L — SIGNIFICANT CHANGE UP (ref 4–32)
AST SERPL-CCNC: 39 U/L — HIGH (ref 4–32)
BILIRUB SERPL-MCNC: < 0.2 MG/DL — LOW (ref 0.2–1.2)
BILIRUB SERPL-MCNC: < 0.2 MG/DL — LOW (ref 0.2–1.2)
BUN SERPL-MCNC: 10 MG/DL — SIGNIFICANT CHANGE UP (ref 7–23)
BUN SERPL-MCNC: 13 MG/DL — SIGNIFICANT CHANGE UP (ref 7–23)
CALCIUM SERPL-MCNC: 7.4 MG/DL — LOW (ref 8.4–10.5)
CALCIUM SERPL-MCNC: 7.7 MG/DL — LOW (ref 8.4–10.5)
CHLORIDE SERPL-SCNC: 100 MMOL/L — SIGNIFICANT CHANGE UP (ref 98–107)
CHLORIDE SERPL-SCNC: 101 MMOL/L — SIGNIFICANT CHANGE UP (ref 98–107)
CO2 SERPL-SCNC: 20 MMOL/L — LOW (ref 22–31)
CO2 SERPL-SCNC: 20 MMOL/L — LOW (ref 22–31)
CREAT SERPL-MCNC: 0.56 MG/DL — SIGNIFICANT CHANGE UP (ref 0.5–1.3)
CREAT SERPL-MCNC: 0.63 MG/DL — SIGNIFICANT CHANGE UP (ref 0.5–1.3)
GLUCOSE SERPL-MCNC: 80 MG/DL — SIGNIFICANT CHANGE UP (ref 70–99)
GLUCOSE SERPL-MCNC: 97 MG/DL — SIGNIFICANT CHANGE UP (ref 70–99)
HCT VFR BLD CALC: 34.4 % — LOW (ref 34.5–45)
HCT VFR BLD CALC: 35.6 % — SIGNIFICANT CHANGE UP (ref 34.5–45)
HGB BLD-MCNC: 10.9 G/DL — LOW (ref 11.5–15.5)
HGB BLD-MCNC: 11.6 G/DL — SIGNIFICANT CHANGE UP (ref 11.5–15.5)
MAGNESIUM SERPL-MCNC: 5.8 MG/DL — HIGH (ref 1.6–2.6)
MAGNESIUM SERPL-MCNC: 6.7 MG/DL — HIGH (ref 1.6–2.6)
MAGNESIUM SERPL-MCNC: 7.1 MG/DL — CRITICAL HIGH (ref 1.6–2.6)
MCHC RBC-ENTMCNC: 30.3 PG — SIGNIFICANT CHANGE UP (ref 27–34)
MCHC RBC-ENTMCNC: 30.3 PG — SIGNIFICANT CHANGE UP (ref 27–34)
MCHC RBC-ENTMCNC: 31.7 % — LOW (ref 32–36)
MCHC RBC-ENTMCNC: 32.6 % — SIGNIFICANT CHANGE UP (ref 32–36)
MCV RBC AUTO: 93 FL — SIGNIFICANT CHANGE UP (ref 80–100)
MCV RBC AUTO: 95.6 FL — SIGNIFICANT CHANGE UP (ref 80–100)
NRBC # FLD: 0 K/UL — SIGNIFICANT CHANGE UP (ref 0–0)
NRBC # FLD: 0 K/UL — SIGNIFICANT CHANGE UP (ref 0–0)
PLATELET # BLD AUTO: 286 K/UL — SIGNIFICANT CHANGE UP (ref 150–400)
PLATELET # BLD AUTO: 306 K/UL — SIGNIFICANT CHANGE UP (ref 150–400)
PMV BLD: 10.2 FL — SIGNIFICANT CHANGE UP (ref 7–13)
PMV BLD: 10.4 FL — SIGNIFICANT CHANGE UP (ref 7–13)
POTASSIUM SERPL-MCNC: 4.3 MMOL/L — SIGNIFICANT CHANGE UP (ref 3.5–5.3)
POTASSIUM SERPL-MCNC: 4.3 MMOL/L — SIGNIFICANT CHANGE UP (ref 3.5–5.3)
POTASSIUM SERPL-SCNC: 4.3 MMOL/L — SIGNIFICANT CHANGE UP (ref 3.5–5.3)
POTASSIUM SERPL-SCNC: 4.3 MMOL/L — SIGNIFICANT CHANGE UP (ref 3.5–5.3)
PROT SERPL-MCNC: 5.9 G/DL — LOW (ref 6–8.3)
PROT SERPL-MCNC: 6.4 G/DL — SIGNIFICANT CHANGE UP (ref 6–8.3)
RBC # BLD: 3.6 M/UL — LOW (ref 3.8–5.2)
RBC # BLD: 3.83 M/UL — SIGNIFICANT CHANGE UP (ref 3.8–5.2)
RBC # FLD: 14.6 % — HIGH (ref 10.3–14.5)
RBC # FLD: 14.8 % — HIGH (ref 10.3–14.5)
SODIUM SERPL-SCNC: 135 MMOL/L — SIGNIFICANT CHANGE UP (ref 135–145)
SODIUM SERPL-SCNC: 137 MMOL/L — SIGNIFICANT CHANGE UP (ref 135–145)
WBC # BLD: 12.26 K/UL — HIGH (ref 3.8–10.5)
WBC # BLD: 12.49 K/UL — HIGH (ref 3.8–10.5)
WBC # FLD AUTO: 12.26 K/UL — HIGH (ref 3.8–10.5)
WBC # FLD AUTO: 12.49 K/UL — HIGH (ref 3.8–10.5)

## 2020-08-14 RX ORDER — NIFEDIPINE 30 MG
30 TABLET, EXTENDED RELEASE 24 HR ORAL DAILY
Refills: 0 | Status: DISCONTINUED | OUTPATIENT
Start: 2020-08-14 | End: 2020-08-15

## 2020-08-14 RX ADMIN — Medication 600 MILLIGRAM(S): at 12:22

## 2020-08-14 RX ADMIN — Medication 975 MILLIGRAM(S): at 08:00

## 2020-08-14 RX ADMIN — Medication 975 MILLIGRAM(S): at 07:27

## 2020-08-14 RX ADMIN — SODIUM CHLORIDE 3 MILLILITER(S): 9 INJECTION INTRAMUSCULAR; INTRAVENOUS; SUBCUTANEOUS at 13:40

## 2020-08-14 RX ADMIN — Medication 30 MILLIGRAM(S): at 00:42

## 2020-08-14 RX ADMIN — Medication 1 TABLET(S): at 20:06

## 2020-08-14 RX ADMIN — Medication 975 MILLIGRAM(S): at 20:06

## 2020-08-14 RX ADMIN — Medication 50 GM/HR: at 09:02

## 2020-08-14 RX ADMIN — MAGNESIUM HYDROXIDE 30 MILLILITER(S): 400 TABLET, CHEWABLE ORAL at 12:27

## 2020-08-14 RX ADMIN — PRAMOXINE HYDROCHLORIDE 1 APPLICATION(S): 150 AEROSOL, FOAM RECTAL at 20:05

## 2020-08-14 RX ADMIN — Medication 600 MILLIGRAM(S): at 12:49

## 2020-08-14 RX ADMIN — Medication 1 APPLICATION(S): at 20:05

## 2020-08-14 RX ADMIN — SIMETHICONE 80 MILLIGRAM(S): 80 TABLET, CHEWABLE ORAL at 20:06

## 2020-08-14 RX ADMIN — Medication 1 SPRAY(S): at 20:05

## 2020-08-14 RX ADMIN — AER TRAVELER 1 APPLICATION(S): 0.5 SOLUTION RECTAL; TOPICAL at 20:05

## 2020-08-14 RX ADMIN — Medication 1 APPLICATION(S): at 20:06

## 2020-08-14 RX ADMIN — MAGNESIUM HYDROXIDE 30 MILLILITER(S): 400 TABLET, CHEWABLE ORAL at 20:06

## 2020-08-14 RX ADMIN — Medication 50 GM/HR: at 07:26

## 2020-08-14 NOTE — PROGRESS NOTE ADULT - SUBJECTIVE AND OBJECTIVE BOX
OB Progress Note:  PPD#2    S: 30yo PPD#2 s/p . Pt states that overnight she was having blurry vision around 3AM but it resolved after eating. She states that she noticed a slight headache starting and was just given tylenol for it. Patient feels well. Pain is well controlled. She is tolerating a regular diet and passing flatus. She is voiding spontaneously, and ambulating without difficulty. Endorses light vaginal bleeding, soaking less than 1 pad/hour. Denies CP/SOB. Denies lightheadedness/dizziness. Denies N/V.    O:  Vitals:   Vital Signs Last 24 Hrs  T(C): 36.6 (14 Aug 2020 06:00), Max: 36.8 (13 Aug 2020 13:24)  T(F): 97.8 (14 Aug 2020 06:00), Max: 98.2 (13 Aug 2020 13:24)  HR: 80 (14 Aug 2020 06:00) (73 - 95)  BP: 136/88 (14 Aug 2020 06:00) (135/94 - 158/88)  BP(mean): --  RR: 18 (14 Aug 2020 06:00) (15 - 18)  SpO2: 100% (14 Aug 2020 06:00) (85% - 100%)    MEDICATIONS  (STANDING):  acetaminophen   Tablet .. 975 milliGRAM(s) Oral <User Schedule>  diphtheria/tetanus/pertussis (acellular) Vaccine (ADAcel) 0.5 milliLiter(s) IntraMuscular once  ibuprofen  Tablet. 600 milliGRAM(s) Oral every 6 hours  lactated ringers. 1000 milliLiter(s) (50 mL/Hr) IV Continuous <Continuous>  magnesium sulfate Infusion 2 Gm/Hr (50 mL/Hr) IV Continuous <Continuous>  NIFEdipine XL 30 milliGRAM(s) Oral daily  prenatal multivitamin 1 Tablet(s) Oral daily  sodium chloride 0.9% lock flush 3 milliLiter(s) IV Push every 8 hours    MEDICATIONS  (PRN):  benzocaine 20%/menthol 0.5% Spray 1 Spray(s) Topical every 6 hours PRN for Perineal discomfort  dibucaine 1% Ointment 1 Application(s) Topical every 6 hours PRN Perineal discomfort  diphenhydrAMINE 25 milliGRAM(s) Oral every 6 hours PRN Pruritus  hydrocortisone 1% Cream 1 Application(s) Topical every 6 hours PRN Moderate Pain (4-6)  lanolin Ointment 1 Application(s) Topical every 6 hours PRN nipple soreness  magnesium hydroxide Suspension 30 milliLiter(s) Oral two times a day PRN Constipation  oxyCODONE    IR 5 milliGRAM(s) Oral every 3 hours PRN Moderate to Severe Pain (4-10)  oxyCODONE    IR 5 milliGRAM(s) Oral once PRN Moderate to Severe Pain (4-10)  pramoxine 1%/zinc 5% Cream 1 Application(s) Topical every 4 hours PRN Moderate Pain (4-6)  simethicone 80 milliGRAM(s) Chew every 4 hours PRN Gas  witch hazel Pads 1 Application(s) Topical every 4 hours PRN Perineal discomfort      Labs:  Blood type: B Positive  Rubella IgG: RPR: Negative                          11.6   12.26<H> >-----------< 306    (  @ 06:45 )             35.6                        10.9<L>   12.49<H> >-----------< 286    (  @ 00:50 )             34.4<L>                        10.5<L>   13.59<H> >-----------< 275    (  @ 14:29 )             31.3<L>                        11.1<L>   17.61<H> >-----------< 238    (  @ 08:30 )             32.3<L>                        11.2<L>   11.73<H> >-----------< 241    (  @ 20:40 )             35.0                        12.7   9.04 >-----------< 287    (  @ 10:10 )             37.1    20 @ 00:21      135  |  100  |  13  ----------------------------<  97  4.3   |  20<L>  |  0.63    20 @ 14:29      136  |  104  |  16  ----------------------------<  91  4.6   |  21<L>  |  0.88    20 @ 08:30      138  |  104  |  7   ----------------------------<  123<H>  4.2   |  19<L>  |  0.68    20 @ 20:40      136  |  105  |  9   ----------------------------<  84  4.0   |  17<L>  |  0.61    20 @ 10:10      136  |  103  |  10  ----------------------------<  85  4.7   |  19<L>  |  0.64        Ca    7.7<L>      14 Aug 2020 00:21  Ca    8.7      13 Aug 2020 14:29  Ca    8.6      12 Aug 2020 08:30  Ca    8.7      11 Aug 2020 20:40  Ca    9.5      11 Aug 2020 10:10  Mg     5.8<H>       Mg     4.8<H>         TPro  5.9<L>  /  Alb  3.3  /  TBili  < 0.2<L>  /  DBili  x   /  AST  32  /  ALT  13  /  AlkPhos  259<H>  20 @ 00:21  TPro  5.6<L>  /  Alb  2.9<L>  /  TBili  < 0.2<L>  /  DBili  x   /  AST  35<H>  /  ALT  15  /  AlkPhos  254<H>  20 @ 14:29  TPro  5.3<L>  /  Alb  2.8<L>  /  TBili  < 0.2<L>  /  DBili  x   /  AST  32  /  ALT  11  /  AlkPhos  289<H>  20 @ 08:30  TPro  5.6<L>  /  Alb  3.0<L>  /  TBili  0.2  /  DBili  x   /  AST  22  /  ALT  12  /  AlkPhos  296<H>  20 @ 20:40  TPro  6.3  /  Alb  3.3  /  TBili  0.2  /  DBili  x   /  AST  27  /  ALT  12  /  AlkPhos  326<H>  20 @ 10:10          Physical Exam:  General: NAD  Heart: extremities well-perfused  Lungs: breathing comfortably  Abdomen: soft, non-tender, non-distended, fundus firm  Vaginal: lochia wnl  Extremities: No calf tenderness or erythema

## 2020-08-14 NOTE — PROGRESS NOTE ADULT - PROBLEM SELECTOR PLAN 1
- Continue with po analgesia, pain well controlled  - Increase ambulation, SCDs when not ambulating  - Continue regular diet  -  No evidence of ongoing bleeding  - monitor BPs    Yarely Pena, PGY1

## 2020-08-14 NOTE — CHART NOTE - NSCHARTNOTEFT_GEN_A_CORE
Patient seen and examined at approximately 2:30AM. Note entry delayed due to patient care     R1 Chart Note    Patient evaluated at bedside for blurry vision while on Magnesium . Patient denies any headaches, lightheadedness, shortness of breath, N/V.     Patient laying in bed during examination. Lungs clear to auscultation. Fundus firm, minimal bleeding on pad.1+ reflexes in the brachioradial and patella. Mag level 5.8    Vital Signs Last 24 Hrs  T(C): 36.6 (14 Aug 2020 06:00), Max: 36.8 (13 Aug 2020 13:24)  T(F): 97.8 (14 Aug 2020 06:00), Max: 98.2 (13 Aug 2020 13:24)  HR: 80 (14 Aug 2020 06:00) (73 - 95)  BP: 136/88 (14 Aug 2020 06:00) (135/94 - 158/88)  BP(mean): --  RR: 18 (14 Aug 2020 06:00) (15 - 18)  SpO2: 100% (14 Aug 2020 06:00) (85% - 100%)     -CBC   -CMP  -Mag level    -F/u AM CBC   -Continue on Magnesium    d/w Dr. Shona Cunningham PGY-1 Patient seen and examined at approximately 2:30AM. Note entry delayed due to patient care     R1 Chart Note    Patient evaluated at bedside for blurry vision while on Magnesium . Patient denies any headaches, lightheadedness, shortness of breath, N/V.     Patient laying in bed during examination. Lungs clear to auscultation. Fundus firm, minimal bleeding on pad.1+ reflexes in the brachioradial and patella. Mag level 5.8    Vital Signs Last 24 Hrs  T(C): 36.6 (14 Aug 2020 06:00), Max: 36.8 (13 Aug 2020 13:24)  T(F): 97.8 (14 Aug 2020 06:00), Max: 98.2 (13 Aug 2020 13:24)  HR: 80 (14 Aug 2020 06:00) (73 - 95)  BP: 136/88 (14 Aug 2020 06:00) (135/94 - 158/88)  BP(mean): --  RR: 18 (14 Aug 2020 06:00) (15 - 18)  SpO2: 100% (14 Aug 2020 06:00) (85% - 100%)     -CBC   -CMP  -Mag level    -F/u AM CBC   -Continue on Magnesium  -Start Procardia 30XL    d/w Dr. Shona Cunningham PGY-1 Patient seen and examined at approximately 2:30AM. Note entry delayed due to patient care     R1 Chart Note    Patient evaluated at bedside for blurry vision while on Magnesium . Patient denies any headaches, lightheadedness, shortness of breath, N/V.     Patient laying in bed during examination. Lungs clear to auscultation. Fundus firm, minimal bleeding on pad.1+ reflexes in the brachioradial and patella. Mag level 5.8    Vital Signs Last 24 Hrs  T(C): 36.6 (14 Aug 2020 06:00), Max: 36.8 (13 Aug 2020 13:24)  T(F): 97.8 (14 Aug 2020 06:00), Max: 98.2 (13 Aug 2020 13:24)  HR: 80 (14 Aug 2020 06:00) (73 - 95)  BP: 136/88 (14 Aug 2020 06:00) (135/94 - 158/88)  BP(mean): --  RR: 18 (14 Aug 2020 06:00) (15 - 18)  SpO2: 100% (14 Aug 2020 06:00) (85% - 100%)     -CBC   -CMP  -Mag level    -F/u AM CBC   -Continue on Magnesium  -Start Procardia 30XL    d/w Dr. Shona Cunningham PGY-1      *********************************************************************************    Attending Attestation  I discussed the patient with the resident and the plan of care  I agree with the documentation with the following edits and additions:    28yo  with PEC with severe features  On Mag  BPS not well controlled    -HELLP labs and Mg level  -Start Procardia 30XL  -Continue mag for 24hours for seizure prophylaxsis  Continue to monitor    KD Nagel MD KYLE FACOG

## 2020-08-14 NOTE — PROGRESS NOTE ADULT - ATTENDING COMMENTS
Pt seen and examined by me today. I agree with findings, assessment and plan documented in resident note. Pt due to have magnesium discontinued at 3 pm. BPs good. will observe overnight and anticipate discharge home in am.

## 2020-08-14 NOTE — PROGRESS NOTE ADULT - ASSESSMENT
28y/o PPD#2 from  c/b sPEC on Mg and Procardia 30 in stable condition. Pain is well controlled and pt is doing well.

## 2020-08-15 VITALS
SYSTOLIC BLOOD PRESSURE: 119 MMHG | DIASTOLIC BLOOD PRESSURE: 78 MMHG | TEMPERATURE: 98 F | RESPIRATION RATE: 18 BRPM | HEART RATE: 97 BPM | OXYGEN SATURATION: 100 %

## 2020-08-15 RX ORDER — NIFEDIPINE 30 MG
1 TABLET, EXTENDED RELEASE 24 HR ORAL
Qty: 30 | Refills: 0
Start: 2020-08-15 | End: 2020-09-13

## 2020-08-15 RX ORDER — NIFEDIPINE 30 MG
1 TABLET, EXTENDED RELEASE 24 HR ORAL
Qty: 0 | Refills: 0 | DISCHARGE
Start: 2020-08-15

## 2020-08-15 RX ADMIN — Medication 975 MILLIGRAM(S): at 06:04

## 2020-08-15 RX ADMIN — Medication 30 MILLIGRAM(S): at 00:18

## 2020-08-15 NOTE — PROGRESS NOTE ADULT - PROBLEM SELECTOR PLAN 1
- BP stable  - Continue regular diet.  - Increase ambulation.  - Continue motrin, tylenol, oxycodone PRN for pain control.    Erna Vitale MD PGY1

## 2020-08-15 NOTE — PROGRESS NOTE ADULT - ASSESSMENT
A/P: 28yo spEC POD#2 s/p LTCS.  Patient is stable and doing well post-operatively. A/P: 28yo spEC POD#3 s/p LTCS.  Patient is stable and doing well post-operatively.

## 2020-08-15 NOTE — PROGRESS NOTE ADULT - SUBJECTIVE AND OBJECTIVE BOX
OB Progress Note: LTCS, POD#2    S: 30yo sPEC POD#2 s/p LTCS. Pain is well controlled. She is tolerating a regular diet and passing flatus. She is voiding spontaneously, and ambulating without difficulty. Denies CP/SOB. Denies lightheadedness/dizziness. Denies N/V.    O:  Vitals:  Vital Signs Last 24 Hrs  T(C): 36.9 (15 Aug 2020 06:49), Max: 36.9 (15 Aug 2020 06:49)  T(F): 98.4 (15 Aug 2020 06:49), Max: 98.4 (15 Aug 2020 06:49)  HR: 94 (15 Aug 2020 06:49) (81 - 102)  BP: 138/78 (15 Aug 2020 06:49) (112/67 - 138/78)  BP(mean): --  RR: 18 (15 Aug 2020 06:49) (16 - 18)  SpO2: 100% (15 Aug 2020 06:49) (99% - 100%)    MEDICATIONS  (STANDING):  acetaminophen   Tablet .. 975 milliGRAM(s) Oral <User Schedule>  diphtheria/tetanus/pertussis (acellular) Vaccine (ADAcel) 0.5 milliLiter(s) IntraMuscular once  ibuprofen  Tablet. 600 milliGRAM(s) Oral every 6 hours  NIFEdipine XL 30 milliGRAM(s) Oral daily  prenatal multivitamin 1 Tablet(s) Oral daily  sodium chloride 0.9% lock flush 3 milliLiter(s) IV Push every 8 hours      MEDICATIONS  (PRN):  benzocaine 20%/menthol 0.5% Spray 1 Spray(s) Topical every 6 hours PRN for Perineal discomfort  dibucaine 1% Ointment 1 Application(s) Topical every 6 hours PRN Perineal discomfort  diphenhydrAMINE 25 milliGRAM(s) Oral every 6 hours PRN Pruritus  hydrocortisone 1% Cream 1 Application(s) Topical every 6 hours PRN Moderate Pain (4-6)  lanolin Ointment 1 Application(s) Topical every 6 hours PRN nipple soreness  magnesium hydroxide Suspension 30 milliLiter(s) Oral two times a day PRN Constipation  oxyCODONE    IR 5 milliGRAM(s) Oral every 3 hours PRN Moderate to Severe Pain (4-10)  oxyCODONE    IR 5 milliGRAM(s) Oral once PRN Moderate to Severe Pain (4-10)  pramoxine 1%/zinc 5% Cream 1 Application(s) Topical every 4 hours PRN Moderate Pain (4-6)  simethicone 80 milliGRAM(s) Chew every 4 hours PRN Gas  witch hazel Pads 1 Application(s) Topical every 4 hours PRN Perineal discomfort      Labs:  Blood type: B Positive  Rubella IgG: RPR: Negative                          11.6   12.26<H> >-----------< 306    ( 08-14 @ 06:45 )             35.6                        10.9<L>   12.49<H> >-----------< 286    ( 08-14 @ 00:50 )             34.4<L>                        10.5<L>   13.59<H> >-----------< 275    ( 08-13 @ 14:29 )             31.3<L>    08-14-20 @ 06:45      137  |  101  |  10  ----------------------------<  80  4.3   |  20<L>  |  0.56    08-14-20 @ 00:21      135  |  100  |  13  ----------------------------<  97  4.3   |  20<L>  |  0.63    08-13-20 @ 14:29      136  |  104  |  16  ----------------------------<  91  4.6   |  21<L>  |  0.88        Ca    7.4<L>      14 Aug 2020 06:45  Ca    7.7<L>      14 Aug 2020 00:21  Ca    8.7      13 Aug 2020 14:29  Mg     7.1<HH>     08-14  Mg     6.7<H>     08-14  Mg     5.8<H>     08-14  Mg     4.8<H>     08-13    TPro  6.4  /  Alb  3.4  /  TBili  < 0.2<L>  /  DBili  x   /  AST  39<H>  /  ALT  19  /  AlkPhos  294<H>  08-14-20 @ 06:45  TPro  5.9<L>  /  Alb  3.3  /  TBili  < 0.2<L>  /  DBili  x   /  AST  32  /  ALT  13  /  AlkPhos  259<H>  08-14-20 @ 00:21  TPro  5.6<L>  /  Alb  2.9<L>  /  TBili  < 0.2<L>  /  DBili  x   /  AST  35<H>  /  ALT  15  /  AlkPhos  254<H>  08-13-20 @ 14:29    PE:  General: NAD  Abdomen: Soft, appropriately tender, incision c/d/i.  Extremities: No erythema, no pitting edema OB Progress Note: LTCS, POD#3  S: 28yo sPEC POD#3 s/p LTCS. Pain is well controlled. She is tolerating a regular diet and passing flatus. She is voiding spontaneously, and ambulating without difficulty. Denies CP/SOB. Denies lightheadedness/dizziness. Denies N/V.    O:  Vitals:  Vital Signs Last 24 Hrs  T(C): 36.9 (15 Aug 2020 06:49), Max: 36.9 (15 Aug 2020 06:49)  T(F): 98.4 (15 Aug 2020 06:49), Max: 98.4 (15 Aug 2020 06:49)  HR: 94 (15 Aug 2020 06:49) (81 - 102)  BP: 138/78 (15 Aug 2020 06:49) (112/67 - 138/78)  BP(mean): --  RR: 18 (15 Aug 2020 06:49) (16 - 18)  SpO2: 100% (15 Aug 2020 06:49) (99% - 100%)    MEDICATIONS  (STANDING):  acetaminophen   Tablet .. 975 milliGRAM(s) Oral <User Schedule>  diphtheria/tetanus/pertussis (acellular) Vaccine (ADAcel) 0.5 milliLiter(s) IntraMuscular once  ibuprofen  Tablet. 600 milliGRAM(s) Oral every 6 hours  NIFEdipine XL 30 milliGRAM(s) Oral daily  prenatal multivitamin 1 Tablet(s) Oral daily  sodium chloride 0.9% lock flush 3 milliLiter(s) IV Push every 8 hours      MEDICATIONS  (PRN):  benzocaine 20%/menthol 0.5% Spray 1 Spray(s) Topical every 6 hours PRN for Perineal discomfort  dibucaine 1% Ointment 1 Application(s) Topical every 6 hours PRN Perineal discomfort  diphenhydrAMINE 25 milliGRAM(s) Oral every 6 hours PRN Pruritus  hydrocortisone 1% Cream 1 Application(s) Topical every 6 hours PRN Moderate Pain (4-6)  lanolin Ointment 1 Application(s) Topical every 6 hours PRN nipple soreness  magnesium hydroxide Suspension 30 milliLiter(s) Oral two times a day PRN Constipation  oxyCODONE    IR 5 milliGRAM(s) Oral every 3 hours PRN Moderate to Severe Pain (4-10)  oxyCODONE    IR 5 milliGRAM(s) Oral once PRN Moderate to Severe Pain (4-10)  pramoxine 1%/zinc 5% Cream 1 Application(s) Topical every 4 hours PRN Moderate Pain (4-6)  simethicone 80 milliGRAM(s) Chew every 4 hours PRN Gas  witch hazel Pads 1 Application(s) Topical every 4 hours PRN Perineal discomfort      Labs:  Blood type: B Positive  Rubella IgG: RPR: Negative                          11.6   12.26<H> >-----------< 306    ( 08-14 @ 06:45 )             35.6                        10.9<L>   12.49<H> >-----------< 286    ( 08-14 @ 00:50 )             34.4<L>                        10.5<L>   13.59<H> >-----------< 275    ( 08-13 @ 14:29 )             31.3<L>    08-14-20 @ 06:45      137  |  101  |  10  ----------------------------<  80  4.3   |  20<L>  |  0.56    08-14-20 @ 00:21      135  |  100  |  13  ----------------------------<  97  4.3   |  20<L>  |  0.63    08-13-20 @ 14:29      136  |  104  |  16  ----------------------------<  91  4.6   |  21<L>  |  0.88        Ca    7.4<L>      14 Aug 2020 06:45  Ca    7.7<L>      14 Aug 2020 00:21  Ca    8.7      13 Aug 2020 14:29  Mg     7.1<HH>     08-14  Mg     6.7<H>     08-14  Mg     5.8<H>     08-14  Mg     4.8<H>     08-13    TPro  6.4  /  Alb  3.4  /  TBili  < 0.2<L>  /  DBili  x   /  AST  39<H>  /  ALT  19  /  AlkPhos  294<H>  08-14-20 @ 06:45  TPro  5.9<L>  /  Alb  3.3  /  TBili  < 0.2<L>  /  DBili  x   /  AST  32  /  ALT  13  /  AlkPhos  259<H>  08-14-20 @ 00:21  TPro  5.6<L>  /  Alb  2.9<L>  /  TBili  < 0.2<L>  /  DBili  x   /  AST  35<H>  /  ALT  15  /  AlkPhos  254<H>  08-13-20 @ 14:29    PE:  General: NAD  Abdomen: Soft, appropriately tender, incision c/d/i.  Extremities: No erythema, no pitting edema

## 2020-08-15 NOTE — PROGRESS NOTE ADULT - ATTENDING COMMENTS
Agree with assessment and plan. S/p Magnesium and on procardia 30mg XL. Doing well postpartum. Plan for discharge with follow up in the office.

## 2020-08-18 ENCOUNTER — APPOINTMENT (OUTPATIENT)
Dept: OBGYN | Facility: CLINIC | Age: 30
End: 2020-08-18
Payer: COMMERCIAL

## 2020-08-18 PROCEDURE — 99211 OFF/OP EST MAY X REQ PHY/QHP: CPT

## 2020-09-01 ENCOUNTER — APPOINTMENT (OUTPATIENT)
Dept: OBGYN | Facility: CLINIC | Age: 30
End: 2020-09-01
Payer: COMMERCIAL

## 2020-09-01 PROCEDURE — 99211 OFF/OP EST MAY X REQ PHY/QHP: CPT

## 2020-09-03 RX ORDER — NIFEDIPINE 30 MG/1
30 TABLET, FILM COATED, EXTENDED RELEASE ORAL DAILY
Refills: 0 | Status: DISCONTINUED | COMMUNITY
Start: 2020-08-16 | End: 2020-09-03

## 2020-09-15 ENCOUNTER — APPOINTMENT (OUTPATIENT)
Dept: OBGYN | Facility: CLINIC | Age: 30
End: 2020-09-15
Payer: COMMERCIAL

## 2020-09-15 VITALS
TEMPERATURE: 98.3 F | DIASTOLIC BLOOD PRESSURE: 72 MMHG | BODY MASS INDEX: 25.66 KG/M2 | SYSTOLIC BLOOD PRESSURE: 108 MMHG | HEIGHT: 65 IN | HEART RATE: 71 BPM | WEIGHT: 154 LBS

## 2020-09-15 PROCEDURE — 99213 OFFICE O/P EST LOW 20 MIN: CPT

## 2020-09-29 ENCOUNTER — APPOINTMENT (OUTPATIENT)
Dept: OBGYN | Facility: CLINIC | Age: 30
End: 2020-09-29
Payer: COMMERCIAL

## 2020-09-29 ENCOUNTER — ASOB RESULT (OUTPATIENT)
Age: 30
End: 2020-09-29

## 2020-09-29 VITALS
SYSTOLIC BLOOD PRESSURE: 117 MMHG | DIASTOLIC BLOOD PRESSURE: 77 MMHG | TEMPERATURE: 97.9 F | HEIGHT: 65 IN | WEIGHT: 155 LBS | BODY MASS INDEX: 25.83 KG/M2 | HEART RATE: 76 BPM

## 2020-09-29 DIAGNOSIS — T83.39XA OTHER MECHANICAL COMPLICATION OF INTRAUTERINE CONTRACEPTIVE DEVICE, INITIAL ENCOUNTER: ICD-10-CM

## 2020-09-29 LAB
HCG UR QL: NEGATIVE
QUALITY CONTROL: YES

## 2020-09-29 PROCEDURE — 76830 TRANSVAGINAL US NON-OB: CPT

## 2020-09-29 PROCEDURE — 58300 INSERT INTRAUTERINE DEVICE: CPT

## 2020-09-29 PROCEDURE — 0503F POSTPARTUM CARE VISIT: CPT | Mod: 25

## 2020-09-29 PROCEDURE — 58301 REMOVE INTRAUTERINE DEVICE: CPT

## 2020-10-02 NOTE — HISTORY OF PRESENT ILLNESS
[Postpartum Follow Up] : postpartum follow up [Complications:___] : complications include: [unfilled] [Preeclampsia] : preeclampsia [Delivery Date: ___] : on [unfilled] [Male] : Delivery History: baby boy [Wt. ___] : weighing [unfilled] [Vaginal Delivery] : vaginal delivery [Pre-Eclampsia] : pre-eclampsia [Delivery Date Was ___] : delivery date was [unfilled] [Pertussis Vaccine] : Pertussis vaccine administered [Boy] : baby is a boy [Infant's Name ___] : [unfilled] [___ Lbs] : [unfilled] lbs [___ Oz] : [unfilled] oz [Circumcised] : circumcised [Living at Home] : is currently living at home [Bottle Feeding] : bottle feeding [Breastfeeding] : currently nursing [Intended Contraception] : Intended Contraception: [IUD] : intrauterine device [Normal] : the vagina was normal [Doing Well] : is doing well [No Sign of Infection] : is showing no signs of infection [None] : None [S/Sx PP Depression] : signs/symptoms of postpartum depression [(0) No, not at all] : (0) No, not at all [(2) Definitely not so much now] : (2) Yes, some of the time [(3) Yes, most of the time] : (3) Yes, most of the time [(2) Yes, some of the time] : (2) Yes, some of the time [[place] Depression Scale ___ (0-30)] : [unfilled] [Rhogam] : Rhogam was not administered [Rubella Vaccine] : Rubella vaccine was not administered [BTL] : no tubal ligation [BF with Difficulty] : nursing without difficulty [Resumed Menses] : has not resumed her menses [Resumed Donovan] : has not resumed intercourse

## 2020-10-23 ENCOUNTER — APPOINTMENT (OUTPATIENT)
Dept: OBGYN | Facility: CLINIC | Age: 30
End: 2020-10-23
Payer: COMMERCIAL

## 2020-10-23 VITALS
TEMPERATURE: 69 F | SYSTOLIC BLOOD PRESSURE: 131 MMHG | WEIGHT: 159 LBS | HEIGHT: 65 IN | HEART RATE: 69 BPM | DIASTOLIC BLOOD PRESSURE: 78 MMHG | BODY MASS INDEX: 26.49 KG/M2

## 2020-10-23 LAB
HCG UR QL: NEGATIVE
QUALITY CONTROL: YES

## 2020-10-23 PROCEDURE — 99072 ADDL SUPL MATRL&STAF TM PHE: CPT

## 2020-10-23 PROCEDURE — 11981 INSERTION DRUG DLVR IMPLANT: CPT

## 2020-10-26 ENCOUNTER — NON-APPOINTMENT (OUTPATIENT)
Age: 30
End: 2020-10-26

## 2021-02-13 ENCOUNTER — TRANSCRIPTION ENCOUNTER (OUTPATIENT)
Age: 31
End: 2021-02-13

## 2021-10-07 NOTE — H&P PST ADULT - IS PATIENT PREGNANT?
Anesthesia Pre Eval Note    Anesthesia ROS/Med Hx    Overall Review:  EKG was reviewed and Echo was reviewed     Anesthetic Complication History:  Patient does not have a history of anesthetic complications      Pulmonary Review:    Positive for COPD     Neuro/Psych Review:  Negative for seizures   Negative for CVA    Cardiovascular Review:  Exercise tolerance: poor (<4 METS)  Positive for CHF  Positive for pulmonary hypertension  Negative for angina  Positive for VILLEGAS/ SOB (chronic)  Positive for hypertension  Positive for hyperlipidemia    GI/HEPATIC/RENAL Review:    Positive for GERD  Positive for renal disease - chronic renal insufficiency    End/Other Review:  Positive for diabetes  Positive for anemia    Overall Review of Systems Comments:  Hematemesis at home with anemia and previous h/o upper gi bleed.  No emesis since admit      Relevant Problems   No relevant active problems       Physical Exam     Airway   Mallampati: II  TM Distance: >3 FB  Neck ROM: Full    Cardiovascular    Cardio Rhythm: Regular  Cardio Rate: Normal    Dental Exam  Dental exam normal    Pulmonary Exam    Breath sounds clear to auscultation:  Yes      Anesthesia Plan:  Anesthesia Plan    ASA Status: 4    Anesthesia Type: MAC    Induction: Intravenous    Checklist  Reviewed: Lab Results, Patient Summary, Allergies, Past Med History, Medications, Nursing Notes, Consultations, NPO Status and Problem list  Consent/Risks Discussed Statement:  The proposed anesthetic plan, including its risks and benefits, have been discussed with the Patient along with the risks and benefits of alternatives. Questions were encouraged and answered and the patient and/or representative understands and agrees to proceed.        I discussed with the patient (and/or patient's legal representative) the risks and benefits of the proposed anesthesia plan, MAC, which may include services performed by other anesthesia providers.    Alternative anesthesia plans, if  available, were reviewed with the patient (and/or patient's legal representative). Discussion has been held with the patient (and/or patient's legal representative) regarding risks of anesthesia, which include intra-operative awareness and aspiration and emergent situations that may require change in anesthesia plan.    The patient (and/or patient's legal representative) has indicated understanding, his/her questions have been answered, and he/she wishes to proceed with the planned anesthetic.      Blood Products: Not Anticipated     no

## 2021-10-11 ENCOUNTER — TRANSCRIPTION ENCOUNTER (OUTPATIENT)
Age: 31
End: 2021-10-11

## 2023-01-07 ENCOUNTER — NON-APPOINTMENT (OUTPATIENT)
Age: 33
End: 2023-01-07

## 2023-04-07 ENCOUNTER — NON-APPOINTMENT (OUTPATIENT)
Age: 33
End: 2023-04-07

## 2023-08-28 ENCOUNTER — NON-APPOINTMENT (OUTPATIENT)
Age: 33
End: 2023-08-28

## 2023-08-31 ENCOUNTER — APPOINTMENT (OUTPATIENT)
Dept: OBGYN | Facility: CLINIC | Age: 33
End: 2023-08-31
Payer: COMMERCIAL

## 2023-08-31 VITALS
DIASTOLIC BLOOD PRESSURE: 82 MMHG | HEART RATE: 75 BPM | HEIGHT: 65 IN | BODY MASS INDEX: 26.16 KG/M2 | WEIGHT: 157 LBS | SYSTOLIC BLOOD PRESSURE: 130 MMHG

## 2023-08-31 DIAGNOSIS — Z30.011 ENCOUNTER FOR INITIAL PRESCRIPTION OF CONTRACEPTIVE PILLS: ICD-10-CM

## 2023-08-31 PROCEDURE — 11982 REMOVE DRUG IMPLANT DEVICE: CPT

## 2023-08-31 NOTE — HISTORY OF PRESENT ILLNESS
[No] : Patient does not have concerns regarding sex [Condoms] : uses condoms [Y] : Patient is sexually active [Monogamous (Male Partner)] : is monogamous with a male partner [PapSmeardate] : 08/2019 [FreeTextEntry1] : 08/17/23

## 2023-09-08 ENCOUNTER — APPOINTMENT (OUTPATIENT)
Dept: OBGYN | Facility: CLINIC | Age: 33
End: 2023-09-08

## 2023-10-02 ENCOUNTER — NON-APPOINTMENT (OUTPATIENT)
Age: 33
End: 2023-10-02

## 2023-11-09 ENCOUNTER — ASOB RESULT (OUTPATIENT)
Age: 33
End: 2023-11-09

## 2023-11-09 ENCOUNTER — APPOINTMENT (OUTPATIENT)
Dept: OBGYN | Facility: CLINIC | Age: 33
End: 2023-11-09
Payer: COMMERCIAL

## 2023-11-09 VITALS
HEIGHT: 65 IN | DIASTOLIC BLOOD PRESSURE: 77 MMHG | BODY MASS INDEX: 25.83 KG/M2 | HEART RATE: 77 BPM | WEIGHT: 155 LBS | SYSTOLIC BLOOD PRESSURE: 118 MMHG

## 2023-11-09 DIAGNOSIS — Z01.411 ENCOUNTER FOR GYNECOLOGICAL EXAMINATION (GENERAL) (ROUTINE) WITH ABNORMAL FINDINGS: ICD-10-CM

## 2023-11-09 PROCEDURE — 99395 PREV VISIT EST AGE 18-39: CPT | Mod: 25

## 2023-11-09 PROCEDURE — 76830 TRANSVAGINAL US NON-OB: CPT

## 2023-11-09 PROCEDURE — 58300 INSERT INTRAUTERINE DEVICE: CPT

## 2023-11-10 LAB
C TRACH RRNA SPEC QL NAA+PROBE: NOT DETECTED
HCG UR QL: NEGATIVE
HPV HIGH+LOW RISK DNA PNL CVX: NOT DETECTED
N GONORRHOEA RRNA SPEC QL NAA+PROBE: NOT DETECTED
QUALITY CONTROL: YES
SOURCE AMPLIFICATION: NORMAL

## 2023-11-13 LAB — CYTOLOGY CVX/VAG DOC THIN PREP: NORMAL

## 2024-03-11 ENCOUNTER — APPOINTMENT (OUTPATIENT)
Dept: INTERNAL MEDICINE | Facility: CLINIC | Age: 34
End: 2024-03-11
Payer: COMMERCIAL

## 2024-03-11 VITALS
HEART RATE: 86 BPM | DIASTOLIC BLOOD PRESSURE: 76 MMHG | BODY MASS INDEX: 25.83 KG/M2 | OXYGEN SATURATION: 99 % | SYSTOLIC BLOOD PRESSURE: 121 MMHG | HEIGHT: 65 IN | WEIGHT: 155 LBS | TEMPERATURE: 98.1 F

## 2024-03-11 DIAGNOSIS — M79.669 PAIN IN UNSPECIFIED LOWER LEG: ICD-10-CM

## 2024-03-11 DIAGNOSIS — O21.9 VOMITING OF PREGNANCY, UNSPECIFIED: ICD-10-CM

## 2024-03-11 DIAGNOSIS — Z00.00 ENCOUNTER FOR GENERAL ADULT MEDICAL EXAMINATION W/OUT ABNORMAL FINDINGS: ICD-10-CM

## 2024-03-11 DIAGNOSIS — Z86.59 PERSONAL HISTORY OF OTHER MENTAL AND BEHAVIORAL DISORDERS: ICD-10-CM

## 2024-03-11 PROCEDURE — 99385 PREV VISIT NEW AGE 18-39: CPT | Mod: 25

## 2024-03-11 PROCEDURE — 36415 COLL VENOUS BLD VENIPUNCTURE: CPT

## 2024-03-11 PROCEDURE — 99203 OFFICE O/P NEW LOW 30 MIN: CPT | Mod: 25

## 2024-03-11 RX ORDER — ACETAMINOPHEN 325 MG/1
325 TABLET, FILM COATED ORAL
Refills: 0 | Status: DISCONTINUED | COMMUNITY
Start: 2020-08-16 | End: 2024-03-11

## 2024-03-11 RX ORDER — FAMOTIDINE 20 MG
TABLET ORAL
Refills: 0 | Status: DISCONTINUED | COMMUNITY
End: 2024-03-11

## 2024-03-11 RX ORDER — IBUPROFEN 600 MG/1
600 TABLET, FILM COATED ORAL EVERY 6 HOURS
Refills: 0 | Status: DISCONTINUED | COMMUNITY
Start: 2020-08-16 | End: 2024-03-11

## 2024-03-11 RX ORDER — NORETHINDRONE ACETATE AND ETHINYL ESTRADIOL AND FERROUS FUMARATE 1MG-20(21)
1-20 KIT ORAL
Qty: 3 | Refills: 3 | Status: DISCONTINUED | COMMUNITY
Start: 2023-08-31 | End: 2024-03-11

## 2024-03-11 RX ORDER — DOXYCYCLINE HYCLATE 100 MG/1
100 CAPSULE ORAL
Qty: 14 | Refills: 0 | Status: DISCONTINUED | COMMUNITY
Start: 2020-09-29 | End: 2024-03-11

## 2024-03-11 NOTE — HEALTH RISK ASSESSMENT
[0] : 2) Feeling down, depressed, or hopeless: Not at all (0) [PHQ-2 Negative - No further assessment needed] : PHQ-2 Negative - No further assessment needed [[id number]] : 0 [Never] : Never

## 2024-03-11 NOTE — PHYSICAL EXAM
[No Varicosities] : no varicosities [No Extremity Clubbing/Cyanosis] : no extremity clubbing/cyanosis [Soft] : abdomen soft [Non-distended] : non-distended [Non Tender] : non-tender [No Joint Swelling] : no joint swelling [No Rash] : no rash [Coordination Grossly Intact] : coordination grossly intact [No Focal Deficits] : no focal deficits [Normal Gait] : normal gait [Normal] : affect was normal and insight and judgment were intact

## 2024-03-11 NOTE — HISTORY OF PRESENT ILLNESS
[FreeTextEntry1] : CPE [de-identified] : Here for CPE  Also complaining of left calf tenderness, occurs intermittently Had US to r/o DVT in 2020 On Mirena IUD

## 2024-03-11 NOTE — PLAN
[FreeTextEntry1] : Reviewed age appropriate preventive screening with patient today and importance of regular screening as indicated. Encouraged exercise of at least 30 mins daily of moderate activity as tolerated.  If unable to participate in moderate activity, encouraged walking daily for 20 to 30mins. Discussed healthy dietary intake of vegetables, whole grains, lean proteins with avoidance of high sugar and sodium intake. Completed labs in office today, will await results and notify patient accordingly Reviewed and assessed depression screening and mood today with patient  -US ordered for left calf pain, low suspicion, possibly MSK in nature

## 2024-03-15 LAB
ALBUMIN SERPL ELPH-MCNC: 4.8 G/DL
ALP BLD-CCNC: 55 U/L
ALT SERPL-CCNC: 9 U/L
ANION GAP SERPL CALC-SCNC: 7 MMOL/L
AST SERPL-CCNC: 15 U/L
BASOPHILS # BLD AUTO: 0.03 K/UL
BASOPHILS NFR BLD AUTO: 0.5 %
BILIRUB SERPL-MCNC: 0.4 MG/DL
BUN SERPL-MCNC: 11 MG/DL
CALCIUM SERPL-MCNC: 9.5 MG/DL
CHLORIDE SERPL-SCNC: 104 MMOL/L
CHOLEST SERPL-MCNC: 195 MG/DL
CO2 SERPL-SCNC: 25 MMOL/L
CREAT SERPL-MCNC: 0.63 MG/DL
EGFR: 119 ML/MIN/1.73M2
EOSINOPHIL # BLD AUTO: 0.06 K/UL
EOSINOPHIL NFR BLD AUTO: 1.1 %
ESTIMATED AVERAGE GLUCOSE: 94 MG/DL
GLUCOSE SERPL-MCNC: 89 MG/DL
HBA1C MFR BLD HPLC: 4.9 %
HCT VFR BLD CALC: 40.6 %
HDLC SERPL-MCNC: 83 MG/DL
HGB BLD-MCNC: 14 G/DL
IMM GRANULOCYTES NFR BLD AUTO: 0.2 %
LDLC SERPL CALC-MCNC: 105 MG/DL
LYMPHOCYTES # BLD AUTO: 2.03 K/UL
LYMPHOCYTES NFR BLD AUTO: 36.7 %
MAN DIFF?: NORMAL
MCHC RBC-ENTMCNC: 31.8 PG
MCHC RBC-ENTMCNC: 34.5 GM/DL
MCV RBC AUTO: 92.3 FL
MONOCYTES # BLD AUTO: 0.55 K/UL
MONOCYTES NFR BLD AUTO: 9.9 %
NEUTROPHILS # BLD AUTO: 2.85 K/UL
NEUTROPHILS NFR BLD AUTO: 51.6 %
NONHDLC SERPL-MCNC: 112 MG/DL
PLATELET # BLD AUTO: 258 K/UL
POTASSIUM SERPL-SCNC: 4.2 MMOL/L
PROT SERPL-MCNC: 7.1 G/DL
RBC # BLD: 4.4 M/UL
RBC # FLD: 12.8 %
SODIUM SERPL-SCNC: 136 MMOL/L
TRIGL SERPL-MCNC: 37 MG/DL
WBC # FLD AUTO: 5.53 K/UL

## 2024-03-25 ENCOUNTER — TRANSCRIPTION ENCOUNTER (OUTPATIENT)
Age: 34
End: 2024-03-25

## 2024-03-28 ENCOUNTER — APPOINTMENT (OUTPATIENT)
Dept: CARDIOLOGY | Facility: CLINIC | Age: 34
End: 2024-03-28
Payer: COMMERCIAL

## 2024-03-28 PROCEDURE — 93971 EXTREMITY STUDY: CPT

## 2024-05-14 NOTE — ASU DISCHARGE PLAN (ADULT/PEDIATRIC) - NO INTERCOURSE DURATION
[Patient reported PAP Smear was normal] : Patient reported PAP Smear was normal [Patient reported colonoscopy was normal] : Patient reported colonoscopy was normal [postmenopausal] : postmenopausal [Currently In Menopause] : currently in menopause [Post-Menopause, No Sxs] : post-menopausal, currently without symptoms [FreeTextEntry1] : Ms. Harkins Ayala Archuleta 61yo presents for an annual exam with no concerns. [Mammogramdate] : 3/2024 [TextBox_19] : F/u appt 6/13/2024 due to swollen lymph node under right arm. [BreastSonogramDate] : 3/2024 [PapSmeardate] : 12/13/2021 [ColonoscopyDate] : 2013 [TextBox_43] : pancreatic bx done as well. 2 weeks

## 2024-06-03 NOTE — ASU PATIENT PROFILE, ADULT - PATIENT REPRESENTATIVE NAME
Price (Do Not Change): 0.00
Instructions: This plan will send the code FBSE to the PM system.  DO NOT or CHANGE the price.
Detail Level: Generalized
same as above

## 2024-08-15 NOTE — OB RN TRIAGE NOTE - AS TEMP SITE
Quality 226: Preventive Care And Screening: Tobacco Use: Screening And Cessation Intervention: Patient screened for tobacco use and is an ex/non-smoker Detail Level: Detailed Quality 130: Documentation Of Current Medications In The Medical Record: Current Medications Documented Quality 431: Preventive Care And Screening: Unhealthy Alcohol Use - Screening: Patient not identified as an unhealthy alcohol user when screened for unhealthy alcohol use using a systematic screening method oral

## 2025-01-23 ENCOUNTER — APPOINTMENT (OUTPATIENT)
Dept: INTERNAL MEDICINE | Facility: CLINIC | Age: 35
End: 2025-01-23
Payer: COMMERCIAL

## 2025-01-23 VITALS
SYSTOLIC BLOOD PRESSURE: 102 MMHG | HEIGHT: 65 IN | WEIGHT: 155 LBS | BODY MASS INDEX: 25.83 KG/M2 | HEART RATE: 86 BPM | TEMPERATURE: 98.5 F | RESPIRATION RATE: 14 BRPM | DIASTOLIC BLOOD PRESSURE: 58 MMHG | OXYGEN SATURATION: 98 %

## 2025-01-23 DIAGNOSIS — I87.2 VENOUS INSUFFICIENCY (CHRONIC) (PERIPHERAL): ICD-10-CM

## 2025-01-23 DIAGNOSIS — R60.0 LOCALIZED EDEMA: ICD-10-CM

## 2025-01-23 DIAGNOSIS — M79.669 PAIN IN UNSPECIFIED LOWER LEG: ICD-10-CM

## 2025-01-23 PROCEDURE — G2211 COMPLEX E/M VISIT ADD ON: CPT | Mod: NC

## 2025-01-23 PROCEDURE — 99203 OFFICE O/P NEW LOW 30 MIN: CPT

## 2025-01-23 RX ORDER — HYDROCHLOROTHIAZIDE 12.5 MG/1
12.5 TABLET ORAL
Qty: 30 | Refills: 0 | Status: ACTIVE | COMMUNITY
Start: 2025-01-23 | End: 1900-01-01

## 2025-02-05 ENCOUNTER — APPOINTMENT (OUTPATIENT)
Dept: VASCULAR SURGERY | Facility: CLINIC | Age: 35
End: 2025-02-05
Payer: COMMERCIAL

## 2025-02-05 ENCOUNTER — APPOINTMENT (OUTPATIENT)
Dept: VASCULAR SURGERY | Facility: CLINIC | Age: 35
End: 2025-02-05

## 2025-02-05 VITALS
BODY MASS INDEX: 25.83 KG/M2 | RESPIRATION RATE: 16 BRPM | SYSTOLIC BLOOD PRESSURE: 119 MMHG | HEART RATE: 66 BPM | DIASTOLIC BLOOD PRESSURE: 74 MMHG | TEMPERATURE: 97.6 F | HEIGHT: 65 IN | WEIGHT: 155 LBS

## 2025-02-05 DIAGNOSIS — D17.24 BENIGN LIPOMATOUS NEOPLASM OF SKIN AND SUBCUTANEOUS TISSUE OF LEFT LEG: ICD-10-CM

## 2025-02-05 PROCEDURE — 99203 OFFICE O/P NEW LOW 30 MIN: CPT

## 2025-02-05 PROCEDURE — 93970 EXTREMITY STUDY: CPT
